# Patient Record
Sex: MALE | URBAN - METROPOLITAN AREA
[De-identification: names, ages, dates, MRNs, and addresses within clinical notes are randomized per-mention and may not be internally consistent; named-entity substitution may affect disease eponyms.]

---

## 2023-06-01 ENCOUNTER — HOSPITAL ENCOUNTER (EMERGENCY)
Age: 38
Discharge: HOME | End: 2023-06-01

## 2023-06-01 VITALS
TEMPERATURE: 97.9 F | DIASTOLIC BLOOD PRESSURE: 81 MMHG | RESPIRATION RATE: 16 BRPM | HEIGHT: 67 IN | WEIGHT: 199 LBS | HEART RATE: 70 BPM | SYSTOLIC BLOOD PRESSURE: 126 MMHG | OXYGEN SATURATION: 98 % | BODY MASS INDEX: 31.23 KG/M2

## 2023-11-29 ENCOUNTER — HOSPITAL ENCOUNTER (INPATIENT)
Age: 38
LOS: 1 days | Discharge: HOME | End: 2023-11-30
Attending: EMERGENCY MEDICINE | Admitting: INTERNAL MEDICINE
Payer: COMMERCIAL

## 2023-11-29 DIAGNOSIS — E11.10 DIABETIC KETOACIDOSIS WITHOUT COMA ASSOCIATED WITH TYPE 2 DIABETES MELLITUS (HCC): Primary | ICD-10-CM

## 2023-11-29 LAB
ALBUMIN SERPL-MCNC: 5 GM/DL (ref 3.4–5)
ALP BLD-CCNC: 81 IU/L (ref 40–129)
ALT SERPL-CCNC: 32 U/L (ref 10–40)
ANION GAP SERPL CALCULATED.3IONS-SCNC: 16 MMOL/L (ref 4–16)
ANION GAP SERPL CALCULATED.3IONS-SCNC: 17 MMOL/L (ref 4–16)
AST SERPL-CCNC: 17 IU/L (ref 15–37)
B-OH-BUTYR SERPL-MCNC: >46.8 MG/DL (ref 0–3)
BACTERIA: NEGATIVE /HPF
BASE EXCESS: 4 (ref 0–3.3)
BASE EXCESS: 7 (ref 0–3.3)
BASOPHILS ABSOLUTE: 0 K/CU MM
BASOPHILS RELATIVE PERCENT: 0.5 % (ref 0–1)
BILIRUB SERPL-MCNC: 0.4 MG/DL (ref 0–1)
BILIRUBIN URINE: ABNORMAL MG/DL
BLOOD, URINE: ABNORMAL
BUN SERPL-MCNC: 11 MG/DL (ref 6–23)
BUN SERPL-MCNC: 12 MG/DL (ref 6–23)
CALCIUM SERPL-MCNC: 10.2 MG/DL (ref 8.3–10.6)
CALCIUM SERPL-MCNC: 8.8 MG/DL (ref 8.3–10.6)
CHLORIDE BLD-SCNC: 101 MMOL/L (ref 99–110)
CHLORIDE BLD-SCNC: 94 MMOL/L (ref 99–110)
CHP ED QC CHECK: YES
CLARITY: CLEAR
CO2: 19 MMOL/L (ref 21–32)
CO2: 20 MMOL/L (ref 21–32)
COLOR: YELLOW
COMMENT: ABNORMAL
COMMENT: ABNORMAL
CREAT SERPL-MCNC: 0.9 MG/DL (ref 0.9–1.3)
CREAT SERPL-MCNC: 1 MG/DL (ref 0.9–1.3)
DIFFERENTIAL TYPE: ABNORMAL
EOSINOPHILS ABSOLUTE: 0.1 K/CU MM
EOSINOPHILS RELATIVE PERCENT: 1 % (ref 0–3)
GFR SERPL CREATININE-BSD FRML MDRD: >60 ML/MIN/1.73M2
GFR SERPL CREATININE-BSD FRML MDRD: >60 ML/MIN/1.73M2
GLUCOSE BLD-MCNC: 140 MG/DL (ref 70–99)
GLUCOSE BLD-MCNC: 160 MG/DL (ref 70–99)
GLUCOSE BLD-MCNC: 168 MG/DL (ref 70–99)
GLUCOSE BLD-MCNC: 205 MG/DL (ref 70–99)
GLUCOSE BLD-MCNC: 223 MG/DL (ref 70–99)
GLUCOSE BLD-MCNC: 253 MG/DL (ref 70–99)
GLUCOSE BLD-MCNC: 324 MG/DL (ref 70–99)
GLUCOSE BLD-MCNC: 325 MG/DL
GLUCOSE BLD-MCNC: 325 MG/DL (ref 70–99)
GLUCOSE SERPL-MCNC: 216 MG/DL (ref 70–99)
GLUCOSE SERPL-MCNC: 341 MG/DL (ref 70–99)
GLUCOSE, URINE: 250 MG/DL
HCO3 VENOUS: 21 MMOL/L (ref 19–25)
HCO3 VENOUS: 22.2 MMOL/L (ref 19–25)
HCT VFR BLD CALC: 45.2 % (ref 42–52)
HEMOGLOBIN: 15.1 GM/DL (ref 13.5–18)
IMMATURE NEUTROPHIL %: 0.3 % (ref 0–0.43)
KETONES, URINE: >80 MG/DL
LEUKOCYTE ESTERASE, URINE: NEGATIVE
LYMPHOCYTES ABSOLUTE: 2.3 K/CU MM
LYMPHOCYTES RELATIVE PERCENT: 30.2 % (ref 24–44)
MAGNESIUM: 1.6 MG/DL (ref 1.8–2.4)
MAGNESIUM: 1.7 MG/DL (ref 1.8–2.4)
MCH RBC QN AUTO: 26.6 PG (ref 27–31)
MCHC RBC AUTO-ENTMCNC: 33.4 % (ref 32–36)
MCV RBC AUTO: 79.6 FL (ref 78–100)
MONOCYTES ABSOLUTE: 0.5 K/CU MM
MONOCYTES RELATIVE PERCENT: 6.7 % (ref 0–4)
MUCUS: ABNORMAL HPF
NITRITE URINE, QUANTITATIVE: NEGATIVE
NUCLEATED RBC %: 0 %
O2 SAT, VEN: 58.8 % (ref 50–70)
O2 SAT, VEN: 69.9 % (ref 50–70)
PCO2, VEN: 44 MMHG (ref 38–52)
PCO2, VEN: 49 MMHG (ref 38–52)
PDW BLD-RTO: 14.1 % (ref 11.7–14.9)
PH VENOUS: 7.24 (ref 7.32–7.42)
PH VENOUS: 7.31 (ref 7.32–7.42)
PH, URINE: 5.5 (ref 5–8)
PHOSPHORUS: 2.2 MG/DL (ref 2.5–4.9)
PHOSPHORUS: 2.8 MG/DL (ref 2.5–4.9)
PLATELET # BLD: 451 K/CU MM (ref 140–440)
PMV BLD AUTO: 9.8 FL (ref 7.5–11.1)
PO2, VEN: 27 MMHG (ref 28–48)
PO2, VEN: 39 MMHG (ref 28–48)
POTASSIUM SERPL-SCNC: 3.8 MMOL/L (ref 3.5–5.1)
POTASSIUM SERPL-SCNC: 4.6 MMOL/L (ref 3.5–5.1)
PROTEIN UA: 100 MG/DL
RBC # BLD: 5.68 M/CU MM (ref 4.6–6.2)
RBC URINE: 2 /HPF (ref 0–3)
SEGMENTED NEUTROPHILS ABSOLUTE COUNT: 4.7 K/CU MM
SEGMENTED NEUTROPHILS RELATIVE PERCENT: 61.3 % (ref 36–66)
SODIUM BLD-SCNC: 131 MMOL/L (ref 135–145)
SODIUM BLD-SCNC: 136 MMOL/L (ref 135–145)
SPECIFIC GRAVITY UA: >1.03 (ref 1–1.03)
TOTAL IMMATURE NEUTOROPHIL: 0.02 K/CU MM
TOTAL NUCLEATED RBC: 0 K/CU MM
TOTAL PROTEIN: 9 GM/DL (ref 6.4–8.2)
TRICHOMONAS: ABNORMAL /HPF
UROBILINOGEN, URINE: 0.2 MG/DL (ref 0.2–1)
WBC # BLD: 7.7 K/CU MM (ref 4–10.5)
WBC UA: 3 /HPF (ref 0–2)

## 2023-11-29 PROCEDURE — 83036 HEMOGLOBIN GLYCOSYLATED A1C: CPT

## 2023-11-29 PROCEDURE — 2580000003 HC RX 258

## 2023-11-29 PROCEDURE — 6370000000 HC RX 637 (ALT 250 FOR IP)

## 2023-11-29 PROCEDURE — 6360000002 HC RX W HCPCS: Performed by: EMERGENCY MEDICINE

## 2023-11-29 PROCEDURE — 2000000000 HC ICU R&B

## 2023-11-29 PROCEDURE — 80053 COMPREHEN METABOLIC PANEL: CPT

## 2023-11-29 PROCEDURE — 96365 THER/PROPH/DIAG IV INF INIT: CPT

## 2023-11-29 PROCEDURE — 82010 KETONE BODYS QUAN: CPT

## 2023-11-29 PROCEDURE — 99285 EMERGENCY DEPT VISIT HI MDM: CPT

## 2023-11-29 PROCEDURE — 83735 ASSAY OF MAGNESIUM: CPT

## 2023-11-29 PROCEDURE — 96375 TX/PRO/DX INJ NEW DRUG ADDON: CPT

## 2023-11-29 PROCEDURE — 82962 GLUCOSE BLOOD TEST: CPT

## 2023-11-29 PROCEDURE — 84100 ASSAY OF PHOSPHORUS: CPT

## 2023-11-29 PROCEDURE — 2580000003 HC RX 258: Performed by: EMERGENCY MEDICINE

## 2023-11-29 PROCEDURE — 80048 BASIC METABOLIC PNL TOTAL CA: CPT

## 2023-11-29 PROCEDURE — 82805 BLOOD GASES W/O2 SATURATION: CPT

## 2023-11-29 PROCEDURE — 85025 COMPLETE CBC W/AUTO DIFF WBC: CPT

## 2023-11-29 PROCEDURE — 81001 URINALYSIS AUTO W/SCOPE: CPT

## 2023-11-29 PROCEDURE — 2500000003 HC RX 250 WO HCPCS: Performed by: EMERGENCY MEDICINE

## 2023-11-29 RX ORDER — ATORVASTATIN CALCIUM 20 MG/1
TABLET, FILM COATED ORAL
COMMUNITY
Start: 2023-11-17

## 2023-11-29 RX ORDER — 0.9 % SODIUM CHLORIDE 0.9 %
15 INTRAVENOUS SOLUTION INTRAVENOUS ONCE
Status: COMPLETED | OUTPATIENT
Start: 2023-11-29 | End: 2023-11-29

## 2023-11-29 RX ORDER — DEXTROSE MONOHYDRATE, SODIUM CHLORIDE, AND POTASSIUM CHLORIDE 50; 1.49; 4.5 G/1000ML; G/1000ML; G/1000ML
INJECTION, SOLUTION INTRAVENOUS CONTINUOUS PRN
Status: DISCONTINUED | OUTPATIENT
Start: 2023-11-29 | End: 2023-11-29

## 2023-11-29 RX ORDER — DEXTROSE AND SODIUM CHLORIDE 5; .45 G/100ML; G/100ML
INJECTION, SOLUTION INTRAVENOUS CONTINUOUS PRN
Status: DISCONTINUED | OUTPATIENT
Start: 2023-11-29 | End: 2023-11-30

## 2023-11-29 RX ORDER — POLYETHYLENE GLYCOL 3350 17 G/17G
17 POWDER, FOR SOLUTION ORAL DAILY PRN
Status: DISCONTINUED | OUTPATIENT
Start: 2023-11-29 | End: 2023-11-30 | Stop reason: HOSPADM

## 2023-11-29 RX ORDER — SODIUM CHLORIDE, SODIUM LACTATE, POTASSIUM CHLORIDE, AND CALCIUM CHLORIDE .6; .31; .03; .02 G/100ML; G/100ML; G/100ML; G/100ML
1000 INJECTION, SOLUTION INTRAVENOUS ONCE
Status: COMPLETED | OUTPATIENT
Start: 2023-11-29 | End: 2023-11-29

## 2023-11-29 RX ORDER — METFORMIN HYDROCHLORIDE 500 MG/1
500 TABLET, EXTENDED RELEASE ORAL DAILY
Qty: 30 TABLET | Refills: 0 | COMMUNITY
Start: 2023-11-17 | End: 2023-12-17

## 2023-11-29 RX ORDER — SODIUM CHLORIDE 450 MG/100ML
INJECTION, SOLUTION INTRAVENOUS CONTINUOUS
Status: DISCONTINUED | OUTPATIENT
Start: 2023-11-29 | End: 2023-11-30 | Stop reason: ALTCHOICE

## 2023-11-29 RX ORDER — FERROUS SULFATE 325(65) MG
325 TABLET ORAL
Status: DISCONTINUED | OUTPATIENT
Start: 2023-11-30 | End: 2023-11-30 | Stop reason: HOSPADM

## 2023-11-29 RX ORDER — MAGNESIUM SULFATE IN WATER 40 MG/ML
2000 INJECTION, SOLUTION INTRAVENOUS PRN
Status: DISCONTINUED | OUTPATIENT
Start: 2023-11-29 | End: 2023-11-30 | Stop reason: HOSPADM

## 2023-11-29 RX ORDER — ENOXAPARIN SODIUM 100 MG/ML
40 INJECTION SUBCUTANEOUS EVERY EVENING
Status: DISCONTINUED | OUTPATIENT
Start: 2023-11-29 | End: 2023-11-30 | Stop reason: HOSPADM

## 2023-11-29 RX ORDER — SODIUM CHLORIDE 450 MG/100ML
INJECTION, SOLUTION INTRAVENOUS CONTINUOUS
Status: DISCONTINUED | OUTPATIENT
Start: 2023-11-29 | End: 2023-11-29

## 2023-11-29 RX ORDER — POTASSIUM CHLORIDE 7.45 MG/ML
10 INJECTION INTRAVENOUS PRN
Status: DISCONTINUED | OUTPATIENT
Start: 2023-11-29 | End: 2023-11-30 | Stop reason: HOSPADM

## 2023-11-29 RX ORDER — ERGOCALCIFEROL 1.25 MG/1
CAPSULE ORAL
Status: ON HOLD | COMMUNITY
Start: 2023-11-17 | End: 2023-11-30 | Stop reason: HOSPADM

## 2023-11-29 RX ORDER — ATORVASTATIN CALCIUM 10 MG/1
20 TABLET, FILM COATED ORAL NIGHTLY
Status: DISCONTINUED | OUTPATIENT
Start: 2023-11-29 | End: 2023-11-30 | Stop reason: HOSPADM

## 2023-11-29 RX ORDER — FAMOTIDINE 20 MG/1
20 TABLET, FILM COATED ORAL 2 TIMES DAILY
Status: DISCONTINUED | OUTPATIENT
Start: 2023-11-29 | End: 2023-11-30 | Stop reason: HOSPADM

## 2023-11-29 RX ORDER — ERGOCALCIFEROL 1.25 MG/1
50000 CAPSULE ORAL ONCE
Status: COMPLETED | OUTPATIENT
Start: 2023-11-29 | End: 2023-11-29

## 2023-11-29 RX ORDER — INSULIN GLARGINE 100 [IU]/ML
40 INJECTION, SOLUTION SUBCUTANEOUS NIGHTLY
Qty: 12 ML | Refills: 2 | Status: ON HOLD | COMMUNITY
Start: 2023-11-17 | End: 2023-11-30 | Stop reason: HOSPADM

## 2023-11-29 RX ORDER — FERROUS SULFATE 325(65) MG
TABLET ORAL
COMMUNITY
Start: 2023-11-17

## 2023-11-29 RX ADMIN — ERGOCALCIFEROL 50000 UNITS: 1.25 CAPSULE ORAL at 22:07

## 2023-11-29 RX ADMIN — POTASSIUM CHLORIDE, DEXTROSE MONOHYDRATE AND SODIUM CHLORIDE: 150; 5; 450 INJECTION, SOLUTION INTRAVENOUS at 18:40

## 2023-11-29 RX ADMIN — DEXTROSE AND SODIUM CHLORIDE: 5; 450 INJECTION, SOLUTION INTRAVENOUS at 18:50

## 2023-11-29 RX ADMIN — ATORVASTATIN CALCIUM 20 MG: 10 TABLET, FILM COATED ORAL at 22:06

## 2023-11-29 RX ADMIN — SODIUM CHLORIDE 1355 ML: 9 INJECTION, SOLUTION INTRAVENOUS at 17:39

## 2023-11-29 RX ADMIN — FAMOTIDINE 20 MG: 20 TABLET ORAL at 22:06

## 2023-11-29 RX ADMIN — SODIUM CHLORIDE 5.79 UNITS/HR: 9 INJECTION, SOLUTION INTRAVENOUS at 19:38

## 2023-11-29 RX ADMIN — SODIUM CHLORIDE, POTASSIUM CHLORIDE, SODIUM LACTATE AND CALCIUM CHLORIDE 1000 ML: 600; 310; 30; 20 INJECTION, SOLUTION INTRAVENOUS at 17:24

## 2023-11-29 RX ADMIN — MAGNESIUM SULFATE HEPTAHYDRATE 2000 MG: 40 INJECTION, SOLUTION INTRAVENOUS at 23:54

## 2023-11-29 RX ADMIN — DEXTROSE AND SODIUM CHLORIDE 150 ML/HR: 5; 450 INJECTION, SOLUTION INTRAVENOUS at 19:44

## 2023-11-29 ASSESSMENT — ENCOUNTER SYMPTOMS
NAUSEA: 0
COUGH: 0
DIARRHEA: 0
BACK PAIN: 0
VOMITING: 0
SHORTNESS OF BREATH: 0
SORE THROAT: 0
ABDOMINAL PAIN: 0

## 2023-11-29 ASSESSMENT — PAIN SCALES - GENERAL: PAINLEVEL_OUTOF10: 0

## 2023-11-29 NOTE — CARE COORDINATION
[facility] criteria for DKA reviewed at this time, criteria supports Inpatient Admission.  CHIDI,RN/CM

## 2023-11-29 NOTE — ED NOTES
1682 called Dr Carlos Gardner on in patient consult from critical care provider     Dianne Tirado  11/29/23 3620 [facility]

## 2023-11-29 NOTE — H&P
V2.0  History and Physical      Name:  Lonzo Sacks /Age/Sex: 1985  (41 y.o. male)   MRN & CSN:  [id number] & [id number] Encounter Date/Time: 2023 6:39 PM EST   Location:  ED28/ED-28 PCP: No primary care provider on file. Hospital Day: 1    Assessment and Plan:   Lonzo Sacks is a 41 y.o. male with a pmh of DM, Hyperlipidemia, vit D and iron deficiency who presents with polyuria. Diabetic ketoacidosis  Type 2? Diabetes Mellitus  Does not appear infectious at this time, likely 2/2 noncompliance due to the inability to obtain his prescriptions as well as decreased understanding of diagnosis and treatment  Glu 341, AGAP 17  BHB > 46.8  VBG 7.24/49/39/  At [facility], thyroid function was nml; HILARIA autoantibody < 5; antipancreatic islet cells were negative; insulin c pep 0.63  Endocrinology following    Hyperlipidemia  Resume statin    Vitamin D deficiency  Iron deficiency  Resume vit D and iron supplements    Diet: NPO    Prophylaxis  SCDs, Lovenox, Pepcid    CODE STATUS: Full code    Plan:  Admit to ICU  Consult endocrinology  Consult case management, will need help obtaining prescriptions and potentially a new glucometer  POC Glu every hour  Initiate insulin drip  Electrolyte repletion per protocol  Maintenance IVF per DKA protocol  BMP, mag, Phos, VBG every 4 hours  CBC daily  Collect hemoglobin A1c  Maintain n.p.o. Disposition:   Current Living situation: home  Expected Disposition: home  Estimated D/C: 2 days    Diet Diet NPO   DVT Prophylaxis [x] Lovenox, []  Heparin, [x] SCDs, [] Ambulation,  [] Eliquis, [] Xarelto   Code Status No Order   Surrogate Decision Maker/ POA Unknown at this time     History from:     patient, electronic medical record    History of Present Illness:     Chief Complaint: polyuria  Lonzo Sacks is a 41 y.o. male with pmh of DM who presents with polyuria, weakness and visual disturbances. Patient is being admitted for DKA.   Patient recently admitted

## 2023-11-29 NOTE — ED NOTES
1750 paged critical care provider     Lamont Holguin  11/29/23 1750    1808 critical care provider returned call     Lamont Holguin  11/29/23 181

## 2023-11-30 VITALS
HEIGHT: 67 IN | DIASTOLIC BLOOD PRESSURE: 78 MMHG | OXYGEN SATURATION: 97 % | RESPIRATION RATE: 20 BRPM | BODY MASS INDEX: 27.68 KG/M2 | WEIGHT: 176.37 LBS | TEMPERATURE: 96.8 F | HEART RATE: 83 BPM | SYSTOLIC BLOOD PRESSURE: 114 MMHG

## 2023-11-30 LAB
ANION GAP SERPL CALCULATED.3IONS-SCNC: 12 MMOL/L (ref 4–16)
ANION GAP SERPL CALCULATED.3IONS-SCNC: 13 MMOL/L (ref 4–16)
BASE EXCESS: 4 (ref 0–3.3)
BASOPHILS ABSOLUTE: 0 K/CU MM
BASOPHILS RELATIVE PERCENT: 0.5 % (ref 0–1)
BUN SERPL-MCNC: 10 MG/DL (ref 6–23)
BUN SERPL-MCNC: 9 MG/DL (ref 6–23)
CALCIUM SERPL-MCNC: 8.4 MG/DL (ref 8.3–10.6)
CALCIUM SERPL-MCNC: 8.5 MG/DL (ref 8.3–10.6)
CHLORIDE BLD-SCNC: 102 MMOL/L (ref 99–110)
CHLORIDE BLD-SCNC: 103 MMOL/L (ref 99–110)
CO2: 19 MMOL/L (ref 21–32)
CO2: 21 MMOL/L (ref 21–32)
COMMENT: ABNORMAL
CREAT SERPL-MCNC: 0.7 MG/DL (ref 0.9–1.3)
CREAT SERPL-MCNC: 0.8 MG/DL (ref 0.9–1.3)
DIFFERENTIAL TYPE: ABNORMAL
EOSINOPHILS ABSOLUTE: 0.2 K/CU MM
EOSINOPHILS RELATIVE PERCENT: 2.3 % (ref 0–3)
ESTIMATED AVERAGE GLUCOSE: 278 MG/DL
GFR SERPL CREATININE-BSD FRML MDRD: >60 ML/MIN/1.73M2
GFR SERPL CREATININE-BSD FRML MDRD: >60 ML/MIN/1.73M2
GLUCOSE BLD-MCNC: 139 MG/DL (ref 70–99)
GLUCOSE BLD-MCNC: 152 MG/DL (ref 70–99)
GLUCOSE BLD-MCNC: 153 MG/DL (ref 70–99)
GLUCOSE BLD-MCNC: 161 MG/DL (ref 70–99)
GLUCOSE BLD-MCNC: 162 MG/DL (ref 70–99)
GLUCOSE BLD-MCNC: 164 MG/DL (ref 70–99)
GLUCOSE BLD-MCNC: 179 MG/DL (ref 70–99)
GLUCOSE BLD-MCNC: 181 MG/DL (ref 70–99)
GLUCOSE BLD-MCNC: 195 MG/DL (ref 70–99)
GLUCOSE BLD-MCNC: 208 MG/DL (ref 70–99)
GLUCOSE BLD-MCNC: 249 MG/DL (ref 70–99)
GLUCOSE SERPL-MCNC: 158 MG/DL (ref 70–99)
GLUCOSE SERPL-MCNC: 161 MG/DL (ref 70–99)
HBA1C MFR BLD: 11.3 % (ref 4.2–6.3)
HCO3 VENOUS: 22.2 MMOL/L (ref 19–25)
HCT VFR BLD CALC: 35.8 % (ref 42–52)
HEMOGLOBIN: 12.2 GM/DL (ref 13.5–18)
IMMATURE NEUTROPHIL %: 0.2 % (ref 0–0.43)
LYMPHOCYTES ABSOLUTE: 2.4 K/CU MM
LYMPHOCYTES RELATIVE PERCENT: 35.6 % (ref 24–44)
MAGNESIUM: 1.9 MG/DL (ref 1.8–2.4)
MCH RBC QN AUTO: 26.9 PG (ref 27–31)
MCHC RBC AUTO-ENTMCNC: 34.1 % (ref 32–36)
MCV RBC AUTO: 78.9 FL (ref 78–100)
MONOCYTES ABSOLUTE: 0.7 K/CU MM
MONOCYTES RELATIVE PERCENT: 10.1 % (ref 0–4)
NUCLEATED RBC %: 0 %
O2 SAT, VEN: 81.1 % (ref 50–70)
PCO2, VEN: 43 MMHG (ref 38–52)
PDW BLD-RTO: 14.2 % (ref 11.7–14.9)
PH VENOUS: 7.32 (ref 7.32–7.42)
PHOSPHORUS: 4.4 MG/DL (ref 2.5–4.9)
PLATELET # BLD: 356 K/CU MM (ref 140–440)
PMV BLD AUTO: 9.6 FL (ref 7.5–11.1)
PO2, VEN: 52 MMHG (ref 28–48)
POTASSIUM SERPL-SCNC: 3.4 MMOL/L (ref 3.5–5.1)
POTASSIUM SERPL-SCNC: 3.4 MMOL/L (ref 3.5–5.1)
RBC # BLD: 4.54 M/CU MM (ref 4.6–6.2)
SEGMENTED NEUTROPHILS ABSOLUTE COUNT: 3.4 K/CU MM
SEGMENTED NEUTROPHILS RELATIVE PERCENT: 51.3 % (ref 36–66)
SODIUM BLD-SCNC: 135 MMOL/L (ref 135–145)
SODIUM BLD-SCNC: 135 MMOL/L (ref 135–145)
TOTAL IMMATURE NEUTOROPHIL: 0.01 K/CU MM
TOTAL NUCLEATED RBC: 0 K/CU MM
WBC # BLD: 6.6 K/CU MM (ref 4–10.5)

## 2023-11-30 PROCEDURE — 80048 BASIC METABOLIC PNL TOTAL CA: CPT

## 2023-11-30 PROCEDURE — 6370000000 HC RX 637 (ALT 250 FOR IP): Performed by: STUDENT IN AN ORGANIZED HEALTH CARE EDUCATION/TRAINING PROGRAM

## 2023-11-30 PROCEDURE — 2580000003 HC RX 258

## 2023-11-30 PROCEDURE — 2580000003 HC RX 258: Performed by: EMERGENCY MEDICINE

## 2023-11-30 PROCEDURE — 94761 N-INVAS EAR/PLS OXIMETRY MLT: CPT

## 2023-11-30 PROCEDURE — 83735 ASSAY OF MAGNESIUM: CPT

## 2023-11-30 PROCEDURE — 6360000002 HC RX W HCPCS: Performed by: EMERGENCY MEDICINE

## 2023-11-30 PROCEDURE — 2580000003 HC RX 258: Performed by: INTERNAL MEDICINE

## 2023-11-30 PROCEDURE — 82962 GLUCOSE BLOOD TEST: CPT

## 2023-11-30 PROCEDURE — 84100 ASSAY OF PHOSPHORUS: CPT

## 2023-11-30 PROCEDURE — 2500000003 HC RX 250 WO HCPCS: Performed by: EMERGENCY MEDICINE

## 2023-11-30 PROCEDURE — 6370000000 HC RX 637 (ALT 250 FOR IP)

## 2023-11-30 PROCEDURE — 82805 BLOOD GASES W/O2 SATURATION: CPT

## 2023-11-30 PROCEDURE — 6370000000 HC RX 637 (ALT 250 FOR IP): Performed by: INTERNAL MEDICINE

## 2023-11-30 PROCEDURE — 85025 COMPLETE CBC W/AUTO DIFF WBC: CPT

## 2023-11-30 RX ORDER — SODIUM CHLORIDE 9 MG/ML
INJECTION, SOLUTION INTRAVENOUS CONTINUOUS
Status: DISCONTINUED | OUTPATIENT
Start: 2023-11-30 | End: 2023-11-30 | Stop reason: HOSPADM

## 2023-11-30 RX ORDER — FAMOTIDINE 20 MG/1
20 TABLET, FILM COATED ORAL 2 TIMES DAILY
Qty: 60 TABLET | Refills: 3 | Status: SHIPPED | OUTPATIENT
Start: 2023-11-30

## 2023-11-30 RX ORDER — INSULIN GLARGINE 100 [IU]/ML
30 INJECTION, SOLUTION SUBCUTANEOUS NIGHTLY
Status: DISCONTINUED | OUTPATIENT
Start: 2023-11-30 | End: 2023-11-30 | Stop reason: HOSPADM

## 2023-11-30 RX ORDER — INSULIN LISPRO 100 [IU]/ML
0-8 INJECTION, SOLUTION INTRAVENOUS; SUBCUTANEOUS
Status: DISCONTINUED | OUTPATIENT
Start: 2023-11-30 | End: 2023-11-30 | Stop reason: HOSPADM

## 2023-11-30 RX ORDER — POTASSIUM CHLORIDE 20 MEQ/1
40 TABLET, EXTENDED RELEASE ORAL ONCE
Status: COMPLETED | OUTPATIENT
Start: 2023-11-30 | End: 2023-11-30

## 2023-11-30 RX ORDER — INSULIN LISPRO 100 [IU]/ML
10 INJECTION, SOLUTION INTRAVENOUS; SUBCUTANEOUS
Status: DISCONTINUED | OUTPATIENT
Start: 2023-11-30 | End: 2023-11-30 | Stop reason: HOSPADM

## 2023-11-30 RX ORDER — INSULIN GLARGINE 100 [IU]/ML
30 INJECTION, SOLUTION SUBCUTANEOUS NIGHTLY
Qty: 10 ML | Refills: 3 | Status: SHIPPED | OUTPATIENT
Start: 2023-11-30

## 2023-11-30 RX ORDER — INSULIN LISPRO 100 [IU]/ML
7 INJECTION, SOLUTION INTRAVENOUS; SUBCUTANEOUS
Qty: 6.3 ML | Refills: 0 | Status: SHIPPED | OUTPATIENT
Start: 2023-11-30 | End: 2023-12-30

## 2023-11-30 RX ORDER — INSULIN GLARGINE 100 [IU]/ML
15 INJECTION, SOLUTION SUBCUTANEOUS ONCE
Status: COMPLETED | OUTPATIENT
Start: 2023-11-30 | End: 2023-11-30

## 2023-11-30 RX ORDER — INSULIN LISPRO 100 [IU]/ML
0-4 INJECTION, SOLUTION INTRAVENOUS; SUBCUTANEOUS
Status: DISCONTINUED | OUTPATIENT
Start: 2023-11-30 | End: 2023-11-30 | Stop reason: HOSPADM

## 2023-11-30 RX ADMIN — INSULIN GLARGINE 15 UNITS: 100 INJECTION, SOLUTION SUBCUTANEOUS at 12:23

## 2023-11-30 RX ADMIN — FERROUS SULFATE TAB 325 MG (65 MG ELEMENTAL FE) 325 MG: 325 (65 FE) TAB at 08:47

## 2023-11-30 RX ADMIN — INSULIN LISPRO 10 UNITS: 100 INJECTION, SOLUTION INTRAVENOUS; SUBCUTANEOUS at 12:24

## 2023-11-30 RX ADMIN — SODIUM CHLORIDE: 9 INJECTION, SOLUTION INTRAVENOUS at 08:12

## 2023-11-30 RX ADMIN — POTASSIUM CHLORIDE 10 MEQ: 7.46 INJECTION, SOLUTION INTRAVENOUS at 04:13

## 2023-11-30 RX ADMIN — POTASSIUM CHLORIDE 10 MEQ: 7.46 INJECTION, SOLUTION INTRAVENOUS at 06:25

## 2023-11-30 RX ADMIN — INSULIN LISPRO 10 UNITS: 100 INJECTION, SOLUTION INTRAVENOUS; SUBCUTANEOUS at 09:17

## 2023-11-30 RX ADMIN — DEXTROSE AND SODIUM CHLORIDE: 5; 450 INJECTION, SOLUTION INTRAVENOUS at 01:13

## 2023-11-30 RX ADMIN — POTASSIUM CHLORIDE 40 MEQ: 1500 TABLET, EXTENDED RELEASE ORAL at 12:23

## 2023-11-30 RX ADMIN — SODIUM PHOSPHATE, MONOBASIC, MONOHYDRATE AND SODIUM PHOSPHATE, DIBASIC, ANHYDROUS 15 MMOL: 142; 276 INJECTION, SOLUTION INTRAVENOUS at 00:05

## 2023-11-30 RX ADMIN — POTASSIUM CHLORIDE 10 MEQ: 7.46 INJECTION, SOLUTION INTRAVENOUS at 05:17

## 2023-11-30 RX ADMIN — INSULIN LISPRO 2 UNITS: 100 INJECTION, SOLUTION INTRAVENOUS; SUBCUTANEOUS at 12:23

## 2023-11-30 RX ADMIN — POTASSIUM CHLORIDE 10 MEQ: 7.46 INJECTION, SOLUTION INTRAVENOUS at 02:10

## 2023-11-30 RX ADMIN — FAMOTIDINE 20 MG: 20 TABLET ORAL at 08:47

## 2023-11-30 ASSESSMENT — PAIN SCALES - GENERAL
PAINLEVEL_OUTOF10: 0
PAINLEVEL_OUTOF10: 0

## 2023-11-30 NOTE — ED PROVIDER NOTES
Triage Chief Complaint:    Hyperglycemia    HPI   Guanakito Vera is a 41 y.o. male that presents for evaluation of hyperglycemia. The patient reports that he knows he is diabetic which is a new diagnosis for him. He believes he has been taking his medications. He does state over the last 2 days she has had some nausea decreased appetite some slight abdominal discomfort. He has not eaten anything all day today. The patient has not had any urinary symptoms. No change to bowel movements. Denies any steroids or antibiotics. The patient has not had any fevers or chills cough or congestion. History from : Patient    Limitations to history : Language [place] Creole    ROS:  10 systems reviewed and negative except as above. No past medical history on file. No past surgical history on file. No family history on file.   Social History     Socioeconomic History    Marital status: Single     Spouse name: Not on file    Number of children: Not on file    Years of education: Not on file    Highest education level: Not on file   Occupational History    Not on file   Tobacco Use    Smoking status: Not on file    Smokeless tobacco: Not on file   Substance and Sexual Activity    Alcohol use: Not on file    Drug use: Not on file    Sexual activity: Not on file   Other Topics Concern    Not on file   Social History Narrative    Not on file     Social Determinants of Health     Financial Resource Strain: Not on file   Food Insecurity: Not on file   Transportation Needs: Not on file   Physical Activity: Not on file   Stress: Not on file   Social Connections: Not on file   Intimate Partner Violence: Not on file   Housing Stability: Not on file     Current Facility-Administered Medications   Medication Dose Route Frequency Provider Last Rate Last Admin    dextrose bolus 10% 125 mL  125 mL IntraVENous PRN Eugenia Kenny MD        Or    dextrose bolus 10% 250 mL  250 mL IntraVENous PRN Eugenia Kenny MD        potassium chloride Determinants: Healthcare illiteracy and Financial constraints possibly including lack of insurance, transportation, and/or ability to afford treatment    Records Reviewed : Outpatient Notes from [facility] 2 weeks ago reviewed and does show that time the patient was given diabetic medications and instructions on how to manage this illness. Chronic conditions affecting care: DM    Labs ordered and results as above (interpreted by myself), concerning for beta hydroxybutyrate is significantly elevated, patient is also acidotic with a pH of 724, bicarb on the CMP is 20 with an anion gap of 17 so appears to be borderline DKA. Urinalysis does show greater than 80 ketones. My suspicion is that he is turning into DKA especially given the progression of his GI symptoms.     Patient was given the following medications:  Medications   dextrose bolus 10% 125 mL (has no administration in time range)     Or   dextrose bolus 10% 250 mL (has no administration in time range)   potassium chloride 10 mEq/100 mL IVPB (Peripheral Line) (has no administration in time range)   magnesium sulfate 2000 mg in 50 mL IVPB premix (2,000 mg IntraVENous New Bag 11/29/23 2594)   sodium phosphate 15 mmol in sodium chloride 0.9 % 250 mL IVPB (15 mmol IntraVENous New Bag 11/30/23 0005)   sodium chloride 0.9 % bolus 1,355 mL (0 mLs IntraVENous Stopped 11/29/23 1853)     Followed by   0.45 % sodium chloride infusion ( IntraVENous Not Given 11/29/23 1946)   dextrose bolus 10% 125 mL (has no administration in time range)     Or   dextrose bolus 10% 250 mL (has no administration in time range)   polyethylene glycol (GLYCOLAX) packet 17 g (has no administration in time range)   enoxaparin (LOVENOX) injection 40 mg (40 mg SubCUTAneous Not Given 11/29/23 1946)   dextrose 5 % and 0.45 % sodium chloride infusion (150 mL/hr IntraVENous New Bag 11/29/23 1944)   insulin (HumuLIN R) 100 units in sodium chloride 0.9% 100 mL infusion (2.4 Units/hr IntraVENous

## 2023-11-30 NOTE — PROGRESS NOTES
Outpatient Pharmacy Progress Note for Meds-to-Beds    Total number of Prescriptions Filled: 3  The following medications were dispensed to the patient during the discharge process:  Lantus insulin  Humalog insulin  Insulin pen needles    Additional Documentation:  Medication(s) were delivered to the patient's room prior to discharge      Thank you for letting us serve your patients.   [address], [address] [place] [address]    Phone: [phone]    Fax: [phone]

## 2023-11-30 NOTE — PROGRESS NOTES
4 Eyes Skin Assessment     NAME:  Lashawn Moctezuma  YOB: 1985  MEDICAL RECORD NUMBER:  [id number]    The patient is being assessed for  Admission    I agree that at least one RN has performed a thorough Head to Toe Skin Assessment on the patient. ALL assessment sites listed below have been assessed. Areas assessed by both nurses:    Head, Face, Ears, Shoulders, Back, Chest, Arms, Elbows, Hands, Sacrum. Buttock, Coccyx, Ischium, Legs. Feet and Heels, and Under Medical Devices         Does the Patient have a Wound?  No noted wound(s)       Jesus Prevention initiated by RN: No  Wound Care Orders initiated by RN: No    Pressure Injury (Stage 3,4, Unstageable, DTI, NWPT, and Complex wounds) if present, place Wound referral order by RN under : No    New Ostomies, if present place, Ostomy referral order under : No     Nurse 1 eSignature: Electronically signed by Mendel Innocent, RN on 11/30/23 at 7:25 AM EST    **SHARE this note so that the co-signing nurse can place an eSignature**    Nurse 2 eSignature: Electronically signed by Jaydon Cuevas RN on 11/30/23 at 7:27 AM EST

## 2023-11-30 NOTE — CONSULTS
V2.0  [facility] [place]      Name:  Caprice Espinal /Age/Sex: 1985  (41 y.o. male)   MRN & CSN:  [id number] & [id number] Encounter Date/Time: 2023 12:18 PM EST   Location:  -A PCP: No primary care provider on file. Attending:Eric Etienne., *  Consulting Provider: Ashwini Mccoy MD      Hospital Day: 2    Assessment and Recommendations   Caprice Espinal is a 41 y.o. male who presents with DKA, type 2, not at goal [facility])    Hospital Problems             Last Modified POA    * (Principal) DKA, type 2, not at goal [facility]) 2023 Yes       Recommendations:     Diabetic ketoacidosis - resolved  Type 2? Diabetes Mellitus  Does not appear infectious at this time, likely 2/2 noncompliance due to the inability to obtain his prescriptions as well as decreased understanding of diagnosis and treatment  On admission Glu 341, AGAP 17. AG has closed now  BHB > 46.8  VBG 7.24/49/39/21  At [facility], thyroid function was nml; HILARIA autoantibody < 5; antipancreatic islet cells were negative; insulin c pep 0.63  Endocrinology following  S/p Insulin drip. Will give 15 U Lantus now. Takes 40 U Hs at home. Currently tolerating food well. Will DC fluids. Overall doing better. No n/v or abd pain. Plan to dc home and advised for medical adherence. Needs to follow up with Endocrinology      Hyperlipidemia  Resume statin     Vitamin D deficiency  Iron deficiency  Resume vit D and iron supplements         Diet ADULT DIET; Regular; 4 carb choices (60 gm/meal)   DVT Prophylaxis [x] Lovenox, []  Heparin, [] SCDs, [] Ambulation,  [] Eliquis, [] Xarelto   Code Status Full Code   Surrogate Decision Maker/ POA       Personally reviewed Lab Studies and Imaging       History From:    History obtained from the patient. History of Present Illness:      Chief Complaint: polyuria    Caprice Espinal is a 41 y.o. male who presents with polyuria, weakness and visual disturbances    Currently tolerating food well. Overall doing better. No n/v or abd pain      Review of Systems:      Pertinent positives and negatives discussed in HPI    Objective: Intake/Output Summary (Last 24 hours) at 11/30/2023 1218  Last data filed at 11/30/2023 0956  Gross per 24 hour   Intake 2376.84 ml   Output 1100 ml   Net 1276.84 ml      Vitals:   Vitals:    11/30/23 0957 11/30/23 1003 11/30/23 1103 11/30/23 1203   BP: 121/75 121/75 116/62 109/72   Pulse: 85 87 73 67   Resp:       Temp:       TempSrc:       SpO2:       Weight:       Height:             Physical Exam:      General: NAD  Eyes: EOMI  ENT: neck supple  Cardiovascular: Regular rate. Respiratory: Clear to auscultation  Gastrointestinal: Soft, non tender  Genitourinary: no suprapubic tenderness  Musculoskeletal: No edema  Skin: warm, dry  Neuro: Alert. Psych: Mood appropriate. Past Medical History:   PMHx History reviewed. No pertinent past medical history. PSHX:  has no past surgical history on file.   Allergies: No Known Allergies  Fam HX: No known family hx  Soc HX:   Social History     Socioeconomic History    Marital status: Single     Spouse name: None    Number of children: None    Years of education: None    Highest education level: None   Tobacco Use    Smoking status: Never    Smokeless tobacco: Never   Vaping Use    Vaping Use: Never used   Substance and Sexual Activity    Sexual activity: Defer     Partners: Female     Birth control/protection: Abstinence     Social Determinants of Health     Food Insecurity: No Food Insecurity (11/30/2023)    Hunger Vital Sign     Worried About Running Out of Food in the Last Year: Never true     Ran Out of Food in the Last Year: Never true   Transportation Needs: No Transportation Needs (11/30/2023)    PRAPARE - Transportation     Lack of Transportation (Medical): No     Lack of Transportation (Non-Medical): No   Housing Stability: High Risk (11/30/2023)    Housing Stability Vital Sign     Unable to Pay for Housing in the Last Year:

## 2023-11-30 NOTE — PROGRESS NOTES
Patient discharged to home at this time. Ivs removed. Discharge instructions given to patient via . Insulin education provided. Patient verbalizes understanding at this time. VSS. No s/s of acute distress noted at time of discharge.

## 2023-11-30 NOTE — CARE COORDINATION
Received referral from [facility] op pharmacy to assist with medications at discharge. Approved a 1x voucher for lantus and humalog and faxed to op pharmacy. Unable to assist with famotidine - OTC medication. Added pt to the flagged list.  If pt requests additional help he must complete a Med Assist application and provide required documents to determine eligibility for ongoing assistance.

## 2023-11-30 NOTE — PROGRESS NOTES
SpO2: 95%       Physical Exam:   General: NAD  Eyes: EOMI  ENT: neck supple, trachea midline  Cardiovascular: Regular rate and rhythm  Respiratory: Clear to auscultation, chest expansion symmetric  Gastrointestinal: Soft, non tender, nondistended  Genitourinary: voids without difficulty  Musculoskeletal: No edema, no deformity  Skin: warm, dry, intact  Neuro: Alert. Oriented. Follows commands. Moves all ext spontaneously  Psych: Mood appropriate.      Medications:   Medications:    insulin lispro  10 Units SubCUTAneous TID WC    insulin glargine  30 Units SubCUTAneous Nightly    insulin lispro  0-8 Units SubCUTAneous TID WC    insulin lispro  0-4 Units SubCUTAneous 2 times per day    enoxaparin  40 mg SubCUTAneous QPM    ferrous sulfate  325 mg Oral Daily with breakfast    atorvastatin  20 mg Oral Nightly    famotidine  20 mg Oral BID      Infusions:    sodium chloride 100 mL/hr at 11/30/23 0812    insulin 1.58 Units/hr (11/30/23 0846)     PRN Meds: dextrose bolus, 125 mL, PRN   Or  dextrose bolus, 250 mL, PRN  potassium chloride, 10 mEq, PRN  magnesium sulfate, 2,000 mg, PRN  sodium phosphate 15 mmol in sodium chloride 0.9 % 250 mL IVPB, 15 mmol, PRN  dextrose bolus, 125 mL, PRN   Or  dextrose bolus, 250 mL, PRN  polyethylene glycol, 17 g, Daily PRN        Labs      Recent Results (from the past 24 hour(s))   Blood Gas, Venous    Collection Time: 11/29/23  1:45 PM   Result Value Ref Range    pH, Saul 7.24 (L) 7.32 - 7.42    pCO2, Saul 49 38 - 52 mmHG    pO2, Saul 39 28 - 48 mmHG    Base Excess 7 (H) 0 - 3.3    HCO3, Venous 21.0 19 - 25 MMOL/L    O2 Sat, Saul 69.9 50 - 70 %    Comment VBG    CBC with Auto Differential    Collection Time: 11/29/23  2:10 PM   Result Value Ref Range    WBC 7.7 4.0 - 10.5 K/CU MM    RBC 5.68 4.6 - 6.2 M/CU MM    Hemoglobin 15.1 13.5 - 18.0 GM/DL    Hematocrit 45.2 42 - 52 %    MCV 79.6 78 - 100 FL    MCH 26.6 (L) 27 - 31 PG    MCHC 33.4 32.0 - 36.0 %    RDW 14.1 11.7 - 14.9 %    Platelets BUN 9 6 - 23 MG/DL    Creatinine 0.7 (L) 0.9 - 1.3 MG/DL    Est, Glom Filt Rate >60 >60 mL/min/1.73m2    Calcium 8.5 8.3 - 10.6 MG/DL   Magnesium    Collection Time: 11/30/23  3:15 AM   Result Value Ref Range    Magnesium 1.9 1.8 - 2.4 mg/dl   Phosphorus    Collection Time: 11/30/23  3:15 AM   Result Value Ref Range    Phosphorus 4.4 2.5 - 4.9 MG/DL   CBC with Auto Differential    Collection Time: 11/30/23  3:15 AM   Result Value Ref Range    WBC 6.6 4.0 - 10.5 K/CU MM    RBC 4.54 (L) 4.6 - 6.2 M/CU MM    Hemoglobin 12.2 (L) 13.5 - 18.0 GM/DL    Hematocrit 35.8 (L) 42 - 52 %    MCV 78.9 78 - 100 FL    MCH 26.9 (L) 27 - 31 PG    MCHC 34.1 32.0 - 36.0 %    RDW 14.2 11.7 - 14.9 %    Platelets 629 700 - 737 K/CU MM    MPV 9.6 7.5 - 11.1 FL    Differential Type AUTOMATED DIFFERENTIAL     Segs Relative 51.3 36 - 66 %    Lymphocytes % 35.6 24 - 44 %    Monocytes % 10.1 (H) 0 - 4 %    Eosinophils % 2.3 0 - 3 %    Basophils % 0.5 0 - 1 %    Segs Absolute 3.4 K/CU MM    Lymphocytes Absolute 2.4 K/CU MM    Monocytes Absolute 0.7 K/CU MM    Eosinophils Absolute 0.2 K/CU MM    Basophils Absolute 0.0 K/CU MM    Nucleated RBC % 0.0 %    Total Nucleated RBC 0.0 K/CU MM    Total Immature Neutrophil 0.01 K/CU MM    Immature Neutrophil % 0.2 0 - 0.43 %   POCT Glucose    Collection Time: 11/30/23  4:10 AM   Result Value Ref Range    POC Glucose 161 (H) 70 - 99 MG/DL   POCT Glucose    Collection Time: 11/30/23  5:15 AM   Result Value Ref Range    POC Glucose 179 (H) 70 - 99 MG/DL   POCT Glucose    Collection Time: 11/30/23  6:06 AM   Result Value Ref Range    POC Glucose 181 (H) 70 - 99 MG/DL   POCT Glucose    Collection Time: 11/30/23  6:58 AM   Result Value Ref Range    POC Glucose 153 (H) 70 - 99 MG/DL   POCT Glucose    Collection Time: 11/30/23  8:43 AM   Result Value Ref Range    POC Glucose 139 (H) 70 - 99 MG/DL        Imaging/Diagnostics Last 24 Hours   No results found.     Electronically signed by CHEY Kauffman CNP

## 2023-11-30 NOTE — PROGRESS NOTES
I have personally seen and examined the patient independently. I have reviewed the patient's available data,including medical history and recent test results. Reviewed and discussed note as documented by ADOLFO. I agree with the physical exam findings, assessment and plan. My documented MDM is a substantive portion of the supervisory note. Diabetic ketoacidosis, type II  History of hyperlipidemia  History of iron deficiency  History of vitamin D deficiency    Transition off insulin infusion to subcu program per endocrinology service.   Once accomplished, transfer out of ICU setting, transition care to hospital medicine service    Mobilize    DVT ulcer prophylaxis    Complex decisions required for evaluation and management, reviewed during critical care rounds    Wendy Arguelles  [phone]

## 2023-11-30 NOTE — CARE COORDINATION
11/30/23 1356   Service Assessment   Patient Orientation Alert and Oriented   Cognition Alert   History Provided By Patient;Medical Record   Primary [address] Members   Patient's Healthcare Decision Maker is: Legal Next of [facility]   PCP Verified by CM No   Prior Functional Level Independent in ADLs/IADLs   Current Functional Level Independent in ADLs/IADLs   Can patient return to prior living arrangement Yes   Ability to make needs known: Good  (Intrepreter services)   Family able to assist with home care needs: Yes   Would you like for me to discuss the discharge plan with any other family members/significant others, and if so, who? No   Community Resources None   Social/Functional History   Lives With Family   Type of Home Apartment     Met with patient for discharge planning. He is [facility]- needs  services #[phone]  Amanda Dunbar. Patient is from home; lives with family. He works and voiced concern for return to work slip. He does not have a PCP Will refer to [facility] for appointment. Patient did get scripts filled at [facility] after admission to [facility] (11/15-11/17) Stated that he understood how to use Glucometer and administer insulin. He denies needs with medication affordability and denies food insecurity. He has his care in the lot and plans to drive himself home. Patient denies needs at this time. Reinforced that CM is available for needs and patient may return to ED if issues arise. Shahla Rawls RN      0482 Contacted [facility]- left detailed message for Ced Kramer ([facility] population coordinator) .  Shahla Rawls RN

## 2023-11-30 NOTE — ED NOTES
ED TO INPATIENT SBAR HANDOFF    Patient Name: Frankey Butte   :  1985  41 y.o.    Preferred Name  [facility]  Family/Caregiver Present yes   Restraints no   C-SSRS: Risk of Suicide: No Risk  Sitter no   Sepsis Risk Score Sepsis Risk Score: 0.65      Situation  Chief Complaint   Patient presents with    Hyperglycemia     Brief Description of Patient's Condition: Patient presents with complaint of hyperglycemia  Mental Status: oriented, alert, coherent, logical, thought processes intact, and able to concentrate and follow conversation  Arrived from: home    Imaging:   No orders to display     Abnormal labs:   Abnormal Labs Reviewed   CBC WITH AUTO DIFFERENTIAL - Abnormal; Notable for the following components:       Result Value    MCH 26.6 (*)     Platelets 300 (*)     Monocytes % 6.7 (*)     All other components within normal limits   COMPREHENSIVE METABOLIC PANEL - Abnormal; Notable for the following components:    Sodium 131 (*)     Chloride 94 (*)     CO2 20 (*)     Anion Gap 17 (*)     Glucose 341 (*)     Total Protein 9.0 (*)     All other components within normal limits   BLOOD GAS, VENOUS - Abnormal; Notable for the following components:    pH, Saul 7.24 (*)     Base Excess 7 (*)     All other components within normal limits   BETA-HYDROXYBUTYRATE - Abnormal; Notable for the following components:    Beta-Hydroxybutyrate >46.8 (*)     All other components within normal limits   URINALYSIS WITH REFLEX TO CULTURE - Abnormal; Notable for the following components:    Glucose, Urine 250 (*)     Bilirubin Urine SMALL NUMBER OR AMOUNT OBSERVED (*)     Ketones, Urine >80 (*)     Blood, Urine SMALL NUMBER OR AMOUNT OBSERVED (*)     Protein,  (*)     All other components within normal limits   URINE MICROSCOPIC WITH REFLEX TO CULTURE - Abnormal; Notable for the following components:    WBC, UA 3 (*)     Mucus, UA RARE (*)     All other components within normal limits   POCT GLUCOSE - Abnormal; Notable for the

## 2023-11-30 NOTE — DISCHARGE SUMMARY
V2.0  Discharge Summary    Name:  Williams Gu /Age/Sex: 1985 (41 y.o. male)   Admit Date: 2023  Discharge Date: 23    MRN & CSN:  [id number] & [id number] Encounter Date and Time 23 1:59 PM EST    Attending:  Jewels Roman,* Discharging Provider: Jewels Roman MD       Hospital Course:     Brief HPI: Williams Gu is a 41 y.o. male with pmh of DM who presents with polyuria, weakness and visual disturbances. Patient is being admitted for DKA. Patient recently admitted to [facility] for new onset diabetes with ketosis. Patient was discharged on metformin and 40 units of Lantus at bedtime. Patient states that he did not fill his prescriptions upon discharge as he could not afford them. Patient was also found to be vitamin D and iron deficient. He was started on supplementation at that time. Additionally patient was started on 20 mg of atorvastatin daily for hyperlipidemia. Patient received 2.4L IVF in ED and was started on an insulin drip. VS on admission were /72, HR 87, RR 21, 99% on room air, and T98.3. Pertinent lab values admission include sodium 131, anion gap 17, glucose 341, beta hydroxybutyrate > 46.8.  UA negative for infection. VBG 7.2 . Patient will be admitted to the ICU for further monitoring and management. Brief Problem Based Course:     Diabetic ketoacidosis - resolved  Type 2? Diabetes Mellitus  Does not appear infectious at this time, likely 2/2 noncompliance due to the inability to obtain his prescriptions as well as decreased understanding of diagnosis and treatment  On admission Glu 341, AGAP 17. AG has closed now  BHB > 46.8  VBG 7.24/  At [facility], thyroid function was nml; HILARIA autoantibody < 5; antipancreatic islet cells were negative; insulin c pep 0.63  Endocrinology following  S/p Insulin drip. Will give 15 U Lantus now. Takes 40 U Hs at home. Will DC fluids. Overall doing better. No n/v or abd pain. NEGATIVE 11/29/2023 02:20 PM    COLORU YELLOW 11/29/2023 02:20 PM    WBCUA 3 11/29/2023 02:20 PM    RBCUA 2 11/29/2023 02:20 PM    MUCUS RARE 11/29/2023 02:20 PM    TRICHOMONAS NONE SEEN 11/29/2023 02:20 PM    BACTERIA NEGATIVE 11/29/2023 02:20 PM    CLARITYU CLEAR 11/29/2023 02:20 PM    SPECGRAV >1.030 11/29/2023 02:20 PM    LEUKOCYTESUR NEGATIVE 11/29/2023 02:20 PM    UROBILINOGEN 0.2 11/29/2023 02:20 PM    [address] NUMBER OR AMOUNT OBSERVED 11/29/2023 02:20 PM    BLOODU SMALL NUMBER OR AMOUNT OBSERVED 11/29/2023 02:20 PM    Meagan Hung >80 11/29/2023 02:20 PM     Urine Cultures: No results found for: \"LABURIN\"  Blood Cultures: No results found for: \"BC\"  No results found for: \"BLOODCULT2\"  Organism: No results found for: \"ORG\"    Time Spent Discharging patient 55 minutes    Electronically signed by Richard Diaz MD on 11/30/2023 at 1:59 PM

## 2024-03-11 ENCOUNTER — APPOINTMENT (OUTPATIENT)
Dept: CT IMAGING | Age: 39
DRG: 639 | End: 2024-03-11
Payer: COMMERCIAL

## 2024-03-11 ENCOUNTER — HOSPITAL ENCOUNTER (INPATIENT)
Age: 39
LOS: 2 days | Discharge: HOME | DRG: 639 | End: 2024-03-13
Attending: STUDENT IN AN ORGANIZED HEALTH CARE EDUCATION/TRAINING PROGRAM | Admitting: STUDENT IN AN ORGANIZED HEALTH CARE EDUCATION/TRAINING PROGRAM
Payer: COMMERCIAL

## 2024-03-11 DIAGNOSIS — E11.10 DIABETIC KETOACIDOSIS WITHOUT COMA ASSOCIATED WITH TYPE 2 DIABETES MELLITUS (HCC): Primary | ICD-10-CM

## 2024-03-11 LAB
ALBUMIN SERPL-MCNC: 4.3 GM/DL (ref 3.4–5)
ALP BLD-CCNC: 117 IU/L (ref 40–129)
ALT SERPL-CCNC: 20 U/L (ref 10–40)
ANION GAP SERPL CALCULATED.3IONS-SCNC: 15 MMOL/L (ref 7–16)
ANION GAP SERPL CALCULATED.3IONS-SCNC: 21 MMOL/L (ref 7–16)
AST SERPL-CCNC: 15 IU/L (ref 15–37)
B-OH-BUTYR SERPL-MCNC: >46.8 MG/DL (ref 0–3)
BASE EXCESS: 17 (ref 0–3)
BASOPHILS ABSOLUTE: 0 K/CU MM
BASOPHILS RELATIVE PERCENT: 0.5 % (ref 0–1)
BILIRUB SERPL-MCNC: 0.3 MG/DL (ref 0–1)
BUN SERPL-MCNC: 10 MG/DL (ref 6–23)
BUN SERPL-MCNC: 9 MG/DL (ref 6–23)
CALCIUM SERPL-MCNC: 7.9 MG/DL (ref 8.3–10.6)
CALCIUM SERPL-MCNC: 8.9 MG/DL (ref 8.3–10.6)
CHLORIDE BLD-SCNC: 102 MMOL/L (ref 99–110)
CHLORIDE BLD-SCNC: 115 MMOL/L (ref 99–110)
CO2: 12 MMOL/L (ref 21–32)
CO2: 14 MMOL/L (ref 21–32)
COMMENT: ABNORMAL
CREAT SERPL-MCNC: 0.9 MG/DL (ref 0.9–1.3)
CREAT SERPL-MCNC: 1.1 MG/DL (ref 0.9–1.3)
DIFFERENTIAL TYPE: ABNORMAL
EOSINOPHILS ABSOLUTE: 0 K/CU MM
EOSINOPHILS RELATIVE PERCENT: 0.3 % (ref 0–3)
GFR SERPL CREATININE-BSD FRML MDRD: >60 ML/MIN/1.73M2
GFR SERPL CREATININE-BSD FRML MDRD: >60 ML/MIN/1.73M2
GLUCOSE BLD-MCNC: 214 MG/DL (ref 70–99)
GLUCOSE BLD-MCNC: 215 MG/DL (ref 70–99)
GLUCOSE BLD-MCNC: 243 MG/DL (ref 70–99)
GLUCOSE BLD-MCNC: 455 MG/DL (ref 70–99)
GLUCOSE SERPL-MCNC: 172 MG/DL (ref 70–99)
GLUCOSE SERPL-MCNC: 509 MG/DL (ref 70–99)
HCO3 VENOUS: 11.7 MMOL/L (ref 22–29)
HCT VFR BLD CALC: 47.5 % (ref 42–52)
HEMOGLOBIN: 15.7 GM/DL (ref 13.5–18)
IMMATURE NEUTROPHIL %: 0.3 % (ref 0–0.43)
INFLUENZA A ANTIGEN: NOT DETECTED
INFLUENZA B ANTIGEN: NOT DETECTED
LACTIC ACID, SEPSIS: 1.1 MMOL/L (ref 0.4–2)
LACTIC ACID, SEPSIS: 1.2 MMOL/L (ref 0.4–2)
LYMPHOCYTES ABSOLUTE: 1.7 K/CU MM
LYMPHOCYTES RELATIVE PERCENT: 27.8 % (ref 24–44)
MAGNESIUM: 1.7 MG/DL (ref 1.8–2.4)
MCH RBC QN AUTO: 26.9 PG (ref 27–31)
MCHC RBC AUTO-ENTMCNC: 33.1 % (ref 32–36)
MCV RBC AUTO: 81.3 FL (ref 78–100)
MONOCYTES ABSOLUTE: 0.8 K/CU MM
MONOCYTES RELATIVE PERCENT: 13.3 % (ref 0–4)
NUCLEATED RBC %: 0 %
O2 SAT, VEN: 53 % (ref 50–70)
PCO2, VEN: 36 MMHG (ref 41–51)
PDW BLD-RTO: 15.6 % (ref 11.7–14.9)
PH VENOUS: 7.12 (ref 7.32–7.43)
PHOSPHORUS: 1.9 MG/DL (ref 2.5–4.9)
PLATELET # BLD: 302 K/CU MM (ref 140–440)
PMV BLD AUTO: 10.9 FL (ref 7.5–11.1)
PO2, VEN: 35 MMHG (ref 28–48)
POTASSIUM SERPL-SCNC: 3.4 MMOL/L (ref 3.5–5.1)
POTASSIUM SERPL-SCNC: 5.1 MMOL/L (ref 3.5–5.1)
RBC # BLD: 5.84 M/CU MM (ref 4.6–6.2)
REASON FOR REJECTION: NORMAL
REJECTED TEST: NORMAL
SARS-COV-2 RDRP RESP QL NAA+PROBE: NOT DETECTED
SEGMENTED NEUTROPHILS ABSOLUTE COUNT: 3.4 K/CU MM
SEGMENTED NEUTROPHILS RELATIVE PERCENT: 57.8 % (ref 36–66)
SODIUM BLD-SCNC: 135 MMOL/L (ref 135–145)
SODIUM BLD-SCNC: 144 MMOL/L (ref 135–145)
SOURCE: NORMAL
TOTAL IMMATURE NEUTOROPHIL: 0.02 K/CU MM
TOTAL NUCLEATED RBC: 0 K/CU MM
TOTAL PROTEIN: 8 GM/DL (ref 6.4–8.2)
WBC # BLD: 5.9 K/CU MM (ref 4–10.5)

## 2024-03-11 PROCEDURE — 2580000003 HC RX 258: Performed by: PHYSICIAN ASSISTANT

## 2024-03-11 PROCEDURE — 96374 THER/PROPH/DIAG INJ IV PUSH: CPT

## 2024-03-11 PROCEDURE — 99285 EMERGENCY DEPT VISIT HI MDM: CPT

## 2024-03-11 PROCEDURE — 6370000000 HC RX 637 (ALT 250 FOR IP): Performed by: PHYSICIAN ASSISTANT

## 2024-03-11 PROCEDURE — C9113 INJ PANTOPRAZOLE SODIUM, VIA: HCPCS | Performed by: PHYSICIAN ASSISTANT

## 2024-03-11 PROCEDURE — 87635 SARS-COV-2 COVID-19 AMP PRB: CPT

## 2024-03-11 PROCEDURE — 93005 ELECTROCARDIOGRAM TRACING: CPT | Performed by: PHYSICIAN ASSISTANT

## 2024-03-11 PROCEDURE — 84100 ASSAY OF PHOSPHORUS: CPT

## 2024-03-11 PROCEDURE — 83036 HEMOGLOBIN GLYCOSYLATED A1C: CPT

## 2024-03-11 PROCEDURE — 96375 TX/PRO/DX INJ NEW DRUG ADDON: CPT

## 2024-03-11 PROCEDURE — 6360000002 HC RX W HCPCS: Performed by: PHYSICIAN ASSISTANT

## 2024-03-11 PROCEDURE — 82962 GLUCOSE BLOOD TEST: CPT

## 2024-03-11 PROCEDURE — 82010 KETONE BODYS QUAN: CPT

## 2024-03-11 PROCEDURE — 2000000000 HC ICU R&B

## 2024-03-11 PROCEDURE — 87502 INFLUENZA DNA AMP PROBE: CPT

## 2024-03-11 PROCEDURE — 2580000003 HC RX 258: Performed by: STUDENT IN AN ORGANIZED HEALTH CARE EDUCATION/TRAINING PROGRAM

## 2024-03-11 PROCEDURE — 83735 ASSAY OF MAGNESIUM: CPT

## 2024-03-11 PROCEDURE — 80048 BASIC METABOLIC PNL TOTAL CA: CPT

## 2024-03-11 PROCEDURE — 85025 COMPLETE CBC W/AUTO DIFF WBC: CPT

## 2024-03-11 PROCEDURE — 70450 CT HEAD/BRAIN W/O DYE: CPT

## 2024-03-11 PROCEDURE — 82805 BLOOD GASES W/O2 SATURATION: CPT

## 2024-03-11 PROCEDURE — 83605 ASSAY OF LACTIC ACID: CPT

## 2024-03-11 PROCEDURE — 80053 COMPREHEN METABOLIC PANEL: CPT

## 2024-03-11 RX ORDER — 0.9 % SODIUM CHLORIDE 0.9 %
1000 INTRAVENOUS SOLUTION INTRAVENOUS ONCE
Status: COMPLETED | OUTPATIENT
Start: 2024-03-11 | End: 2024-03-11

## 2024-03-11 RX ORDER — 0.9 % SODIUM CHLORIDE 0.9 %
15 INTRAVENOUS SOLUTION INTRAVENOUS ONCE
Status: DISCONTINUED | OUTPATIENT
Start: 2024-03-11 | End: 2024-03-11

## 2024-03-11 RX ORDER — METOCLOPRAMIDE HYDROCHLORIDE 5 MG/ML
10 INJECTION INTRAMUSCULAR; INTRAVENOUS ONCE
Status: COMPLETED | OUTPATIENT
Start: 2024-03-11 | End: 2024-03-11

## 2024-03-11 RX ORDER — DEXTROSE AND SODIUM CHLORIDE 5; .45 G/100ML; G/100ML
INJECTION, SOLUTION INTRAVENOUS CONTINUOUS PRN
Status: DISCONTINUED | OUTPATIENT
Start: 2024-03-11 | End: 2024-03-12

## 2024-03-11 RX ORDER — SODIUM CHLORIDE, SODIUM LACTATE, POTASSIUM CHLORIDE, CALCIUM CHLORIDE 600; 310; 30; 20 MG/100ML; MG/100ML; MG/100ML; MG/100ML
INJECTION, SOLUTION INTRAVENOUS CONTINUOUS
Status: DISCONTINUED | OUTPATIENT
Start: 2024-03-11 | End: 2024-03-12

## 2024-03-11 RX ORDER — ATORVASTATIN CALCIUM 10 MG/1
20 TABLET, FILM COATED ORAL NIGHTLY
Status: DISCONTINUED | OUTPATIENT
Start: 2024-03-11 | End: 2024-03-13 | Stop reason: HOSPADM

## 2024-03-11 RX ORDER — 0.9 % SODIUM CHLORIDE 0.9 %
1000 INTRAVENOUS SOLUTION INTRAVENOUS ONCE
Status: DISCONTINUED | OUTPATIENT
Start: 2024-03-11 | End: 2024-03-11

## 2024-03-11 RX ORDER — DIPHENHYDRAMINE HYDROCHLORIDE 50 MG/ML
25 INJECTION INTRAMUSCULAR; INTRAVENOUS ONCE
Status: COMPLETED | OUTPATIENT
Start: 2024-03-11 | End: 2024-03-11

## 2024-03-11 RX ORDER — POTASSIUM CHLORIDE 7.45 MG/ML
10 INJECTION INTRAVENOUS PRN
Status: DISCONTINUED | OUTPATIENT
Start: 2024-03-11 | End: 2024-03-13 | Stop reason: HOSPADM

## 2024-03-11 RX ORDER — SODIUM CHLORIDE 450 MG/100ML
INJECTION, SOLUTION INTRAVENOUS CONTINUOUS
Status: DISCONTINUED | OUTPATIENT
Start: 2024-03-11 | End: 2024-03-11

## 2024-03-11 RX ORDER — KETOROLAC TROMETHAMINE 15 MG/ML
15 INJECTION, SOLUTION INTRAMUSCULAR; INTRAVENOUS ONCE
Status: COMPLETED | OUTPATIENT
Start: 2024-03-11 | End: 2024-03-11

## 2024-03-11 RX ORDER — MAGNESIUM SULFATE IN WATER 40 MG/ML
2000 INJECTION, SOLUTION INTRAVENOUS PRN
Status: DISCONTINUED | OUTPATIENT
Start: 2024-03-11 | End: 2024-03-13 | Stop reason: HOSPADM

## 2024-03-11 RX ORDER — DEXTROSE, SODIUM CHLORIDE, SODIUM LACTATE, POTASSIUM CHLORIDE, AND CALCIUM CHLORIDE 5; .6; .31; .03; .02 G/100ML; G/100ML; G/100ML; G/100ML; G/100ML
INJECTION, SOLUTION INTRAVENOUS CONTINUOUS PRN
Status: DISCONTINUED | OUTPATIENT
Start: 2024-03-11 | End: 2024-03-12

## 2024-03-11 RX ORDER — SODIUM CHLORIDE 9 MG/ML
INJECTION, SOLUTION INTRAVENOUS CONTINUOUS
Status: DISCONTINUED | OUTPATIENT
Start: 2024-03-11 | End: 2024-03-11

## 2024-03-11 RX ORDER — POLYETHYLENE GLYCOL 3350 17 G/17G
17 POWDER, FOR SOLUTION ORAL DAILY PRN
Status: DISCONTINUED | OUTPATIENT
Start: 2024-03-11 | End: 2024-03-13 | Stop reason: HOSPADM

## 2024-03-11 RX ORDER — PANTOPRAZOLE SODIUM 40 MG/10ML
40 INJECTION, POWDER, LYOPHILIZED, FOR SOLUTION INTRAVENOUS DAILY
Status: DISCONTINUED | OUTPATIENT
Start: 2024-03-11 | End: 2024-03-12

## 2024-03-11 RX ORDER — 0.9 % SODIUM CHLORIDE 0.9 %
500 INTRAVENOUS SOLUTION INTRAVENOUS ONCE
Status: COMPLETED | OUTPATIENT
Start: 2024-03-11 | End: 2024-03-11

## 2024-03-11 RX ORDER — MAGNESIUM SULFATE IN WATER 40 MG/ML
2000 INJECTION, SOLUTION INTRAVENOUS ONCE
Status: COMPLETED | OUTPATIENT
Start: 2024-03-12 | End: 2024-03-12

## 2024-03-11 RX ORDER — SODIUM CHLORIDE, SODIUM LACTATE, POTASSIUM CHLORIDE, AND CALCIUM CHLORIDE .6; .31; .03; .02 G/100ML; G/100ML; G/100ML; G/100ML
2000 INJECTION, SOLUTION INTRAVENOUS ONCE
Status: DISCONTINUED | OUTPATIENT
Start: 2024-03-11 | End: 2024-03-12

## 2024-03-11 RX ORDER — ENOXAPARIN SODIUM 100 MG/ML
40 INJECTION SUBCUTANEOUS DAILY
Status: DISCONTINUED | OUTPATIENT
Start: 2024-03-11 | End: 2024-03-11 | Stop reason: CLARIF

## 2024-03-11 RX ORDER — ENOXAPARIN SODIUM 100 MG/ML
40 INJECTION SUBCUTANEOUS DAILY
Status: DISCONTINUED | OUTPATIENT
Start: 2024-03-11 | End: 2024-03-13 | Stop reason: HOSPADM

## 2024-03-11 RX ADMIN — DIPHENHYDRAMINE HYDROCHLORIDE 25 MG: 50 INJECTION, SOLUTION INTRAMUSCULAR; INTRAVENOUS at 15:48

## 2024-03-11 RX ADMIN — SODIUM CHLORIDE, SODIUM LACTATE, POTASSIUM CHLORIDE, CALCIUM CHLORIDE AND DEXTROSE MONOHYDRATE: 5; 600; 310; 30; 20 INJECTION, SOLUTION INTRAVENOUS at 21:45

## 2024-03-11 RX ADMIN — KETOROLAC TROMETHAMINE 15 MG: 15 INJECTION, SOLUTION INTRAMUSCULAR; INTRAVENOUS at 15:48

## 2024-03-11 RX ADMIN — INSULIN HUMAN 12 UNITS: 100 INJECTION, SOLUTION PARENTERAL at 19:02

## 2024-03-11 RX ADMIN — SODIUM CHLORIDE, POTASSIUM CHLORIDE, SODIUM LACTATE AND CALCIUM CHLORIDE: 600; 310; 30; 20 INJECTION, SOLUTION INTRAVENOUS at 21:33

## 2024-03-11 RX ADMIN — PANTOPRAZOLE SODIUM 40 MG: 40 INJECTION, POWDER, FOR SOLUTION INTRAVENOUS at 21:54

## 2024-03-11 RX ADMIN — SODIUM CHLORIDE 1000 ML: 9 INJECTION, SOLUTION INTRAVENOUS at 17:26

## 2024-03-11 RX ADMIN — SODIUM CHLORIDE 500 ML: 9 INJECTION, SOLUTION INTRAVENOUS at 15:47

## 2024-03-11 RX ADMIN — METOCLOPRAMIDE 10 MG: 5 INJECTION, SOLUTION INTRAMUSCULAR; INTRAVENOUS at 15:48

## 2024-03-11 RX ADMIN — ATORVASTATIN CALCIUM 20 MG: 10 TABLET, FILM COATED ORAL at 21:54

## 2024-03-11 ASSESSMENT — ENCOUNTER SYMPTOMS
NAUSEA: 1
EYES NEGATIVE: 1
RESPIRATORY NEGATIVE: 1
CONSTIPATION: 0
ABDOMINAL PAIN: 1
VOMITING: 0
ALLERGIC/IMMUNOLOGIC NEGATIVE: 1

## 2024-03-11 ASSESSMENT — PAIN SCALES - GENERAL
PAINLEVEL_OUTOF10: 0
PAINLEVEL_OUTOF10: 6

## 2024-03-12 LAB
ANION GAP SERPL CALCULATED.3IONS-SCNC: 10 MMOL/L (ref 7–16)
ANION GAP SERPL CALCULATED.3IONS-SCNC: 8 MMOL/L (ref 7–16)
BILIRUBIN URINE: NEGATIVE MG/DL
BLOOD, URINE: NEGATIVE
BUN SERPL-MCNC: 8 MG/DL (ref 6–23)
BUN SERPL-MCNC: 9 MG/DL (ref 6–23)
CALCIUM SERPL-MCNC: 8.6 MG/DL (ref 8.3–10.6)
CALCIUM SERPL-MCNC: 8.7 MG/DL (ref 8.3–10.6)
CHLORIDE BLD-SCNC: 109 MMOL/L (ref 99–110)
CHLORIDE BLD-SCNC: 111 MMOL/L (ref 99–110)
CLARITY: CLEAR
CO2: 20 MMOL/L (ref 21–32)
CO2: 22 MMOL/L (ref 21–32)
COLOR: YELLOW
COMMENT UA: ABNORMAL
CREAT SERPL-MCNC: 0.8 MG/DL (ref 0.9–1.3)
CREAT SERPL-MCNC: 0.8 MG/DL (ref 0.9–1.3)
GFR SERPL CREATININE-BSD FRML MDRD: >60 ML/MIN/1.73M2
GFR SERPL CREATININE-BSD FRML MDRD: >60 ML/MIN/1.73M2
GLUCOSE BLD-MCNC: 123 MG/DL (ref 70–99)
GLUCOSE BLD-MCNC: 123 MG/DL (ref 70–99)
GLUCOSE BLD-MCNC: 161 MG/DL (ref 70–99)
GLUCOSE BLD-MCNC: 163 MG/DL (ref 70–99)
GLUCOSE BLD-MCNC: 175 MG/DL (ref 70–99)
GLUCOSE BLD-MCNC: 185 MG/DL (ref 70–99)
GLUCOSE BLD-MCNC: 191 MG/DL (ref 70–99)
GLUCOSE BLD-MCNC: 205 MG/DL (ref 70–99)
GLUCOSE BLD-MCNC: 222 MG/DL (ref 70–99)
GLUCOSE BLD-MCNC: 244 MG/DL (ref 70–99)
GLUCOSE BLD-MCNC: 340 MG/DL (ref 70–99)
GLUCOSE BLD-MCNC: 348 MG/DL (ref 70–99)
GLUCOSE SERPL-MCNC: 133 MG/DL (ref 70–99)
GLUCOSE SERPL-MCNC: 173 MG/DL (ref 70–99)
GLUCOSE, URINE: >1000 MG/DL
KETONES, URINE: 15 MG/DL
LEUKOCYTE ESTERASE, URINE: NEGATIVE
MAGNESIUM: 2 MG/DL (ref 1.8–2.4)
MAGNESIUM: 2.6 MG/DL (ref 1.8–2.4)
NITRITE URINE, QUANTITATIVE: NEGATIVE
PH, URINE: 6 (ref 5–8)
PHOSPHORUS: 2.8 MG/DL (ref 2.5–4.9)
PHOSPHORUS: 3.3 MG/DL (ref 2.5–4.9)
POTASSIUM SERPL-SCNC: 3.3 MMOL/L (ref 3.5–5.1)
POTASSIUM SERPL-SCNC: 4.2 MMOL/L (ref 3.5–5.1)
PROTEIN UA: NEGATIVE MG/DL
SODIUM BLD-SCNC: 139 MMOL/L (ref 135–145)
SODIUM BLD-SCNC: 141 MMOL/L (ref 135–145)
SPECIFIC GRAVITY UA: 1.02 (ref 1–1.03)
UROBILINOGEN, URINE: 0.2 MG/DL (ref 0.2–1)

## 2024-03-12 PROCEDURE — 6360000002 HC RX W HCPCS: Performed by: PHYSICIAN ASSISTANT

## 2024-03-12 PROCEDURE — 2000000000 HC ICU R&B

## 2024-03-12 PROCEDURE — 81003 URINALYSIS AUTO W/O SCOPE: CPT

## 2024-03-12 PROCEDURE — 82962 GLUCOSE BLOOD TEST: CPT

## 2024-03-12 PROCEDURE — 6360000002 HC RX W HCPCS: Performed by: STUDENT IN AN ORGANIZED HEALTH CARE EDUCATION/TRAINING PROGRAM

## 2024-03-12 PROCEDURE — 80048 BASIC METABOLIC PNL TOTAL CA: CPT

## 2024-03-12 PROCEDURE — 94761 N-INVAS EAR/PLS OXIMETRY MLT: CPT

## 2024-03-12 PROCEDURE — 6370000000 HC RX 637 (ALT 250 FOR IP): Performed by: STUDENT IN AN ORGANIZED HEALTH CARE EDUCATION/TRAINING PROGRAM

## 2024-03-12 PROCEDURE — 83735 ASSAY OF MAGNESIUM: CPT

## 2024-03-12 PROCEDURE — 6370000000 HC RX 637 (ALT 250 FOR IP): Performed by: PHYSICIAN ASSISTANT

## 2024-03-12 PROCEDURE — C9113 INJ PANTOPRAZOLE SODIUM, VIA: HCPCS | Performed by: PHYSICIAN ASSISTANT

## 2024-03-12 PROCEDURE — 84100 ASSAY OF PHOSPHORUS: CPT

## 2024-03-12 PROCEDURE — 6370000000 HC RX 637 (ALT 250 FOR IP): Performed by: INTERNAL MEDICINE

## 2024-03-12 PROCEDURE — 2580000003 HC RX 258: Performed by: STUDENT IN AN ORGANIZED HEALTH CARE EDUCATION/TRAINING PROGRAM

## 2024-03-12 RX ORDER — PANTOPRAZOLE SODIUM 40 MG/1
40 TABLET, DELAYED RELEASE ORAL
Status: DISCONTINUED | OUTPATIENT
Start: 2024-03-13 | End: 2024-03-13 | Stop reason: HOSPADM

## 2024-03-12 RX ORDER — INSULIN GLARGINE 100 [IU]/ML
30 INJECTION, SOLUTION SUBCUTANEOUS NIGHTLY
Status: DISCONTINUED | OUTPATIENT
Start: 2024-03-12 | End: 2024-03-13

## 2024-03-12 RX ORDER — INSULIN LISPRO 100 [IU]/ML
0-4 INJECTION, SOLUTION INTRAVENOUS; SUBCUTANEOUS
Status: DISCONTINUED | OUTPATIENT
Start: 2024-03-12 | End: 2024-03-13 | Stop reason: HOSPADM

## 2024-03-12 RX ORDER — INSULIN LISPRO 100 [IU]/ML
0-8 INJECTION, SOLUTION INTRAVENOUS; SUBCUTANEOUS
Status: DISCONTINUED | OUTPATIENT
Start: 2024-03-12 | End: 2024-03-13 | Stop reason: HOSPADM

## 2024-03-12 RX ORDER — INSULIN LISPRO 100 [IU]/ML
10 INJECTION, SOLUTION INTRAVENOUS; SUBCUTANEOUS
Status: DISCONTINUED | OUTPATIENT
Start: 2024-03-12 | End: 2024-03-13

## 2024-03-12 RX ADMIN — INSULIN LISPRO 6 UNITS: 100 INJECTION, SOLUTION INTRAVENOUS; SUBCUTANEOUS at 17:46

## 2024-03-12 RX ADMIN — INSULIN LISPRO 10 UNITS: 100 INJECTION, SOLUTION INTRAVENOUS; SUBCUTANEOUS at 17:46

## 2024-03-12 RX ADMIN — ENOXAPARIN SODIUM 40 MG: 100 INJECTION SUBCUTANEOUS at 08:01

## 2024-03-12 RX ADMIN — POTASSIUM CHLORIDE 10 MEQ: 7.46 INJECTION, SOLUTION INTRAVENOUS at 12:43

## 2024-03-12 RX ADMIN — POTASSIUM BICARBONATE 40 MEQ: 782 TABLET, EFFERVESCENT ORAL at 00:05

## 2024-03-12 RX ADMIN — POTASSIUM & SODIUM PHOSPHATES POWDER PACK 280-160-250 MG 500 MG: 280-160-250 PACK at 21:45

## 2024-03-12 RX ADMIN — POTASSIUM CHLORIDE 10 MEQ: 7.46 INJECTION, SOLUTION INTRAVENOUS at 08:52

## 2024-03-12 RX ADMIN — POTASSIUM CHLORIDE 10 MEQ: 7.46 INJECTION, SOLUTION INTRAVENOUS at 07:33

## 2024-03-12 RX ADMIN — POTASSIUM & SODIUM PHOSPHATES POWDER PACK 280-160-250 MG 500 MG: 280-160-250 PACK at 00:32

## 2024-03-12 RX ADMIN — SODIUM CHLORIDE, SODIUM LACTATE, POTASSIUM CHLORIDE, CALCIUM CHLORIDE AND DEXTROSE MONOHYDRATE: 5; 600; 310; 30; 20 INJECTION, SOLUTION INTRAVENOUS at 04:31

## 2024-03-12 RX ADMIN — ATORVASTATIN CALCIUM 20 MG: 10 TABLET, FILM COATED ORAL at 21:43

## 2024-03-12 RX ADMIN — INSULIN LISPRO 10 UNITS: 100 INJECTION, SOLUTION INTRAVENOUS; SUBCUTANEOUS at 12:21

## 2024-03-12 RX ADMIN — POTASSIUM & SODIUM PHOSPHATES POWDER PACK 280-160-250 MG 500 MG: 280-160-250 PACK at 08:01

## 2024-03-12 RX ADMIN — INSULIN GLARGINE 30 UNITS: 100 INJECTION, SOLUTION SUBCUTANEOUS at 21:44

## 2024-03-12 RX ADMIN — POTASSIUM & SODIUM PHOSPHATES POWDER PACK 280-160-250 MG 500 MG: 280-160-250 PACK at 12:20

## 2024-03-12 RX ADMIN — INSULIN LISPRO 6 UNITS: 100 INJECTION, SOLUTION INTRAVENOUS; SUBCUTANEOUS at 12:20

## 2024-03-12 RX ADMIN — POTASSIUM CHLORIDE 10 MEQ: 7.46 INJECTION, SOLUTION INTRAVENOUS at 11:06

## 2024-03-12 RX ADMIN — POTASSIUM & SODIUM PHOSPHATES POWDER PACK 280-160-250 MG 500 MG: 280-160-250 PACK at 17:46

## 2024-03-12 RX ADMIN — MAGNESIUM SULFATE HEPTAHYDRATE 2000 MG: 40 INJECTION, SOLUTION INTRAVENOUS at 00:06

## 2024-03-12 RX ADMIN — PANTOPRAZOLE SODIUM 40 MG: 40 INJECTION, POWDER, FOR SOLUTION INTRAVENOUS at 08:01

## 2024-03-12 ASSESSMENT — PAIN SCALES - GENERAL
PAINLEVEL_OUTOF10: 0

## 2024-03-12 NOTE — CARE COORDINATION
Marco Antonio/[facility] discharge coordinator called back. Per his records, the appointment was set up for the pt but the pt never showed. Marco Antonio is able to set up another appointment, but if the pt does not show up again they will not set up any more appointments from the hospital and the pt will have to wait on the waiting list.    GIOVANI attempted to speak with pt in room, but he was unavailable using the restroom and other staff were waiting to speak with him. Marco Antonio leaves at 5p tonight. If this CM is unable to speak with the pt and return Marco Antonio's call, CM will f/u tomorrow.     1612 - Staff informed this CM that a family doctor the pt recently saw discontinued the pt's insulin. The pt does not know why. PCP was not with [facility] as the pt is not in their records. CM was once again unable to speak with the pt as the  was speaking with him.

## 2024-03-12 NOTE — CONSULTS
V2.0  History and Physical      Name:  Adrianna Landin /Age/Sex: 1985  (39 y.o. male)   MRN & CSN:  [id number] & [id number] Encounter Date/Time: 3/12/2024 4:23 PM EDT   Location:  -A PCP: No primary care provider on file.       Hospital Day: 2    Assessment and Plan:   Adrianna Landin is a 39 y.o. male with a pmh of  who presents with DKA, type 2, not at goal (HCC)    Hospital Problems             Last Modified POA    * (Principal) DKA, type 2, not at goal (HCC) 3/11/2024 Yes       DKA  Type II DM  Last hemoglobin A1c in 2000 20 to 11.3%  Patient has not been using insulin for the past month as he ran out of prescription and does not have PCP  Started on DKA protocol, bridged to basal bolus insulin  Endocrinology on board, appreciate recommendations  Now started on 30 units nightly of Lantus and 10 units lispro.  On medium dose sliding scale  Continue to monitor glucose and titrate insulin.      Hypokalemia  Hypomagnesemia  Hypophosphatemia  Replete    Disposition:   Current Living situation:   Expected Disposition:  Estimated D/C:     Diet ADULT DIET; Regular; 4 carb choices (60 gm/meal)   DVT Prophylaxis [x] Lovenox, []  Heparin, [] SCDs, [] Ambulation,  [] Eliquis, [] Xarelto, [] Coumadin   Code Status Full Code   Surrogate Decision Maker/ POA      History from:     patient    History of Present Illness:     Chief Complaint: DKA, type 2, not at goal (HCC)  Adrianna Landin is a 39 y.o. male with pmh of type II DM who presents with headaches.  According to the patient, he ran out of his prescriptions and has not been using insulin for the past month.  Evaluation in the emergency room showed patient was in DKA.  Insulin drip was started and was admitted to the ICU.  Internal medicine consulted for transition and continue TF care.    This morning he is doing well.  No headaches.  No nausea or vomiting.     Review of Systems: Need 10 Elements   Review of Systems    Negative if not mentioned

## 2024-03-12 NOTE — CARE COORDINATION
Chart reviewed. Pt is from home with family. Independent of his ADLs. Has insurance from his employment. Does not have a PCP. Pt has diabetes and states that he does not have the necessary supplies for its management. Pt was previously discharge in 11/2023 and connected with [facility] and given diabetic medication with [facility]. Pt was given Lantus and Hemalog. According to Cathie/Outpt pharmacy, the pt still has 3 more refils of 100 units of Lantus left although there may be a copay. CM called [facility] discharge coordinator. Left VM requesting call back re; connecting pt with PCP. Waiting on call back.    Discharge plan will remain home with family. CM working on connecting pt with [facility] for diabetic management. On flagged list with [facility].       03/12/24 5165   Service Assessment   Patient Orientation Alert and Oriented   Cognition Alert   History Provided By Medical Record   Primary Caregiver Self   Support Systems Family Members   Patient's Healthcare Decision Maker is: Legal Next of Kin   PCP Verified by CM No   Prior Functional Level Independent in ADLs/IADLs   Current Functional Level Independent in ADLs/IADLs   Can patient return to prior living arrangement Yes   Ability to make needs known: Good   Family able to assist with home care needs: Yes   Would you like for me to discuss the discharge plan with any other family members/significant others, and if so, who? 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

## 2024-03-12 NOTE — DISCHARGE INSTRUCTIONS
Becky meyer [facility] Mar[facility]  2-1-1:   Enfòmasyadam patel  [phone] o 2-1-1  [email or web address]    Harriet López:  koupon swati laron, sonya rosario, gadri sibvansyone, PRC  [phone]    [facility]:   laron, èd ak lwaye, rad ak b  [phone] or [phone]    Mickie flores:   laron, èd ak lwaye, rad ak b  [phone]    [facility] ak [address]:     [facility]  [address] [place] [address]  [phone]     chiki lópez yo  [address] [place] [address]  [phone]     [facility]:   [facility] frederickOhio[facility], klinik sante granmohemanth mata dapre revni ou  [address] [place], [place]   [phone] o [phone]    Prakashkory douglas [facility] Hospital[facility]:  asistans swati [facility]: [phone]  [email or web address] asistans swati [facility] CenterkrK[facility]erman[facility] Centeryon   [facility]: [facility] calin, [facility] Hospitaljosep mata selon revni ou  [address]   [place] [address]  [phone]    [employer] [place]:  Nitrisyon swati tibebe ak eun goldsteinwa selon revni  [address], [place] [id number] [phone]    [facility]:  ofri sèvis otobis nan [facility]. ADA ofri sèvis pòt an pòt swati moun ki gen andikap  [phone]    Max yon vwayaj:    [phone]    [facility]: transpò swati granmoun hany yo  [phone]

## 2024-03-12 NOTE — ED PROVIDER NOTES
EMERGENCY DEPARTMENT ENCOUNTER        Pt Name: Adrianna Landin  MRN: [id number]  Birthdate 1985  Date of evaluation: 3/11/2024  Provider: MELI CASTAÑEDA  PCP: No primary care provider on file.    ADOLFO. I have evaluated this patient.      Triage CHIEF COMPLAINT       Chief Complaint   Patient presents with    Headache     HISTORY OF PRESENT ILLNESS      Chief Complaint: Headache, polyuria polydipsia, malaise    Adrianna Landin is a 39 y.o. [place] male who presents to the ED via private vehicle with a chief complaint of a headache initially.  Patient states that he has has had a headache over the last 24 to 48 hours.  Gradual onset, no thunderclap presentation.  No nausea or vomiting.  No visual disturbances, no neck pain, no shortness of breath.  States he had history of headaches intermittently in the past.  He has been taking medication without significant improvement.  Patient also states that he is very thirsty and has been urinating frequently has history of diabetes, he feels that he is also constipated.  Patient denies any other symptoms no fevers chills, no recent sick contacts.  No recent trauma.  No abdominal pain.  No chest pain or shortness of breath no lightheadedness or dizziness no syncope or near syncope.  He denies any other significant medical history has been out of his diabetic medications for some time.    Nursing Notes were all reviewed and agreed with or any disagreements were addressed in the HPI.    REVIEW OF SYSTEMS     At least 10 systems reviewed and otherwise acutely negative except as in the [place].     PAST MEDICAL HISTORY   No past medical history on file.    SURGICAL HISTORY   No past surgical history on file.    CURRENTMEDICATIONS       Current Discharge Medication List        CONTINUE these medications which have NOT CHANGED    Details   insulin glargine (LANTUS) 100 UNIT/ML injection vial Inject 30 Units into the skin nightly  Qty: 10 mL, Refills: 3

## 2024-03-12 NOTE — PLAN OF CARE
Problem: Discharge Planning  Goal: Discharge to home or other facility with appropriate resources  Outcome: Progressing  Flowsheets (Taken 3/12/2024 160)  Discharge to home or other facility with appropriate resources:   Identify barriers to discharge with patient and caregiver   Arrange for needed discharge resources and transportation as appropriate   Identify discharge learning needs (meds, wound care, etc)   Arrange for interpreters to assist at discharge as needed   Refer to discharge planning if patient needs post-hospital services based on physician order or complex needs related to functional status, cognitive ability or social support system     Problem: Pain  Goal: Verbalizes/displays adequate comfort level or baseline comfort level  Outcome: Progressing

## 2024-03-13 VITALS
WEIGHT: 175.49 LBS | OXYGEN SATURATION: 98 % | SYSTOLIC BLOOD PRESSURE: 99 MMHG | HEIGHT: 64 IN | RESPIRATION RATE: 22 BRPM | HEART RATE: 82 BPM | DIASTOLIC BLOOD PRESSURE: 69 MMHG | BODY MASS INDEX: 29.96 KG/M2 | TEMPERATURE: 97.8 F

## 2024-03-13 LAB
ANION GAP SERPL CALCULATED.3IONS-SCNC: 10 MMOL/L (ref 7–16)
BUN SERPL-MCNC: 7 MG/DL (ref 6–23)
CALCIUM SERPL-MCNC: 8.4 MG/DL (ref 8.3–10.6)
CHLORIDE BLD-SCNC: 102 MMOL/L (ref 99–110)
CO2: 24 MMOL/L (ref 21–32)
CREAT SERPL-MCNC: 0.7 MG/DL (ref 0.9–1.3)
EKG ATRIAL RATE: 73 BPM
EKG DIAGNOSIS: NORMAL
EKG P AXIS: 35 DEGREES
EKG P-R INTERVAL: 146 MS
EKG Q-T INTERVAL: 370 MS
EKG QRS DURATION: 94 MS
EKG QTC CALCULATION (BAZETT): 407 MS
EKG R AXIS: 71 DEGREES
EKG T AXIS: 45 DEGREES
EKG VENTRICULAR RATE: 73 BPM
GFR SERPL CREATININE-BSD FRML MDRD: >60 ML/MIN/1.73M2
GLUCOSE BLD-MCNC: 148 MG/DL (ref 70–99)
GLUCOSE BLD-MCNC: 243 MG/DL (ref 70–99)
GLUCOSE BLD-MCNC: 282 MG/DL (ref 70–99)
GLUCOSE SERPL-MCNC: 293 MG/DL (ref 70–99)
MAGNESIUM: 1.6 MG/DL (ref 1.8–2.4)
PHOSPHORUS: 3.7 MG/DL (ref 2.5–4.9)
POTASSIUM SERPL-SCNC: 3.5 MMOL/L (ref 3.5–5.1)
SODIUM BLD-SCNC: 136 MMOL/L (ref 135–145)

## 2024-03-13 PROCEDURE — 6360000002 HC RX W HCPCS: Performed by: PHYSICIAN ASSISTANT

## 2024-03-13 PROCEDURE — 83735 ASSAY OF MAGNESIUM: CPT

## 2024-03-13 PROCEDURE — 6370000000 HC RX 637 (ALT 250 FOR IP): Performed by: REGISTERED NURSE

## 2024-03-13 PROCEDURE — 6370000000 HC RX 637 (ALT 250 FOR IP): Performed by: INTERNAL MEDICINE

## 2024-03-13 PROCEDURE — 93010 ELECTROCARDIOGRAM REPORT: CPT | Performed by: INTERNAL MEDICINE

## 2024-03-13 PROCEDURE — 82962 GLUCOSE BLOOD TEST: CPT

## 2024-03-13 PROCEDURE — 6370000000 HC RX 637 (ALT 250 FOR IP): Performed by: STUDENT IN AN ORGANIZED HEALTH CARE EDUCATION/TRAINING PROGRAM

## 2024-03-13 PROCEDURE — 36415 COLL VENOUS BLD VENIPUNCTURE: CPT

## 2024-03-13 PROCEDURE — 84100 ASSAY OF PHOSPHORUS: CPT

## 2024-03-13 PROCEDURE — 80048 BASIC METABOLIC PNL TOTAL CA: CPT

## 2024-03-13 RX ORDER — INSULIN LISPRO 100 [IU]/ML
15 INJECTION, SOLUTION INTRAVENOUS; SUBCUTANEOUS
Qty: 13.5 ML | Refills: 0 | Status: SHIPPED | OUTPATIENT
Start: 2024-03-13 | End: 2024-03-13

## 2024-03-13 RX ORDER — INSULIN LISPRO 100 [IU]/ML
10 INJECTION, SOLUTION INTRAVENOUS; SUBCUTANEOUS
Qty: 9 ML | Refills: 0 | Status: SHIPPED | OUTPATIENT
Start: 2024-03-13 | End: 2024-04-12

## 2024-03-13 RX ORDER — INSULIN GLARGINE 100 [IU]/ML
40 INJECTION, SOLUTION SUBCUTANEOUS NIGHTLY
Status: DISCONTINUED | OUTPATIENT
Start: 2024-03-13 | End: 2024-03-13 | Stop reason: HOSPADM

## 2024-03-13 RX ORDER — PANTOPRAZOLE SODIUM 40 MG/1
40 TABLET, DELAYED RELEASE ORAL
Qty: 30 TABLET | Refills: 3 | Status: SHIPPED | OUTPATIENT
Start: 2024-03-14

## 2024-03-13 RX ORDER — METFORMIN HYDROCHLORIDE 500 MG/1
500 TABLET, EXTENDED RELEASE ORAL DAILY
Qty: 30 TABLET | Refills: 0 | Status: SHIPPED | OUTPATIENT
Start: 2024-03-13 | End: 2024-04-12

## 2024-03-13 RX ORDER — MAGNESIUM SULFATE IN WATER 40 MG/ML
2000 INJECTION, SOLUTION INTRAVENOUS ONCE
Status: DISCONTINUED | OUTPATIENT
Start: 2024-03-13 | End: 2024-03-13

## 2024-03-13 RX ORDER — INSULIN GLARGINE 100 [IU]/ML
40 INJECTION, SOLUTION SUBCUTANEOUS NIGHTLY
Qty: 10 ML | Refills: 3 | Status: SHIPPED | OUTPATIENT
Start: 2024-03-13

## 2024-03-13 RX ORDER — INSULIN LISPRO 100 [IU]/ML
15 INJECTION, SOLUTION INTRAVENOUS; SUBCUTANEOUS
Status: DISCONTINUED | OUTPATIENT
Start: 2024-03-13 | End: 2024-03-13 | Stop reason: HOSPADM

## 2024-03-13 RX ADMIN — PANTOPRAZOLE SODIUM 40 MG: 40 TABLET, DELAYED RELEASE ORAL at 05:12

## 2024-03-13 RX ADMIN — ENOXAPARIN SODIUM 40 MG: 100 INJECTION SUBCUTANEOUS at 09:03

## 2024-03-13 RX ADMIN — INSULIN HUMAN 10 UNITS: 100 INJECTION, SUSPENSION SUBCUTANEOUS at 09:02

## 2024-03-13 RX ADMIN — INSULIN LISPRO 15 UNITS: 100 INJECTION, SOLUTION INTRAVENOUS; SUBCUTANEOUS at 13:46

## 2024-03-13 RX ADMIN — INSULIN LISPRO 15 UNITS: 100 INJECTION, SOLUTION INTRAVENOUS; SUBCUTANEOUS at 09:04

## 2024-03-13 RX ADMIN — MAGNESIUM SULFATE HEPTAHYDRATE 2000 MG: 40 INJECTION, SOLUTION INTRAVENOUS at 05:11

## 2024-03-13 RX ADMIN — POTASSIUM & SODIUM PHOSPHATES POWDER PACK 280-160-250 MG 500 MG: 280-160-250 PACK at 09:02

## 2024-03-13 RX ADMIN — INSULIN LISPRO 4 UNITS: 100 INJECTION, SOLUTION INTRAVENOUS; SUBCUTANEOUS at 09:03

## 2024-03-13 NOTE — DISCHARGE SUMMARY
V2.0  Discharge Summary    Name:  Adrianna Landin /Age/Sex: 1985 (39 y.o. male)   Admit Date: 3/11/2024  Discharge Date: 3/13/24    MRN & CSN:  [id number] & [id number] Encounter Date and Time 3/13/24 11:53 AM EDT    Attending:  Andres Reese,* Discharging Provider: Andres Nguyen MD       Hospital Course:     Brief HPI:Adrianna Landin is a 39 y.o. male with a history of DM who presented to [facility] 3/11/2024 with headache found to be in DKA     Brief Problem Based Course:     DKA  Type II DM  Last hemoglobin A1c in 2000 20 to 11.3%  Patient has not been using insulin for the past month as he ran out of prescription and does not have PCP  Started on DKA protocol, bridged to basal bolus insulin  Endocrinology on board, appreciate recommendations  Now started on 30 units nightly of Lantus and 10 units lispro.  On medium dose sliding scale  Continue to monitor glucose and titrate insulin.    Patient doing better. Denies any abdominal pain, n/v. Tolerating diet. AG closed. Will DC on Lantus 40 Un and Lispro 10 U TID. Advised to f/u as OP        Hypokalemia- resolved    Hypomagnesemia - 1.6 this am. Given 2 gm today before DC    Hypophosphatemia- resolved         The patient expressed appropriate understanding of, and agreement with the discharge recommendations, medications, and plan.     Consults this admission:  IP CONSULT TO CRITICAL CARE  IP CONSULT TO ENDOCRINOLOGY  IP CONSULT TO DIETITIAN  IP CONSULT TO DIABETES EDUCATOR    Discharge Diagnosis:   DKA, type 2, not at goal (HCC)      Discharge Instruction:   Follow up appointments: julia  Primary care physician: No primary care provider on file. within 2 weeks  Diet: diabetic diet   Activity: activity as tolerated  Disposition: Discharged to:   [x]Home, []HHC, []SNF, []Acute Rehab, []Hospice   Condition on discharge: Stable  Labs and Tests to be Followed up as an outpatient by PCP or Specialist:     Discharge Medications:

## 2024-03-13 NOTE — CARE COORDINATION
Discussed in IDR. Contacted [facility]. [facility]  Ismael Stallworth RN

## 2024-03-13 NOTE — PLAN OF CARE
Problem: Discharge Planning  Goal: Discharge to home or other facility with appropriate resources  3/13/2024 0422 by Marilynn Ortiz RN  Outcome: Not Progressing  3/12/2024 1803 by Deisy Castelan RN  Outcome: Progressing  Flowsheets (Taken 3/12/2024 1609)  Discharge to home or other facility with appropriate resources:   Identify barriers to discharge with patient and caregiver   Arrange for needed discharge resources and transportation as appropriate   Identify discharge learning needs (meds, wound care, etc)   Arrange for interpreters to assist at discharge as needed   Refer to discharge planning if patient needs post-hospital services based on physician order or complex needs related to functional status, cognitive ability or social support system     Problem: Discharge Planning  Goal: Discharge to home or other facility with appropriate resources  3/13/2024 0422 by Marilynn Ortiz RN  Outcome: Not Progressing  3/12/2024 1803 by Deisy Castelan RN  Outcome: Progressing  Flowsheets (Taken 3/12/2024 1609)  Discharge to home or other facility with appropriate resources:   Identify barriers to discharge with patient and caregiver   Arrange for needed discharge resources and transportation as appropriate   Identify discharge learning needs (meds, wound care, etc)   Arrange for interpreters to assist at discharge as needed   Refer to discharge planning if patient needs post-hospital services based on physician order or complex needs related to functional status, cognitive ability or social support system

## 2024-03-13 NOTE — FLOWSHEET NOTE
Pt d/c at this time.  Natasha Post  accessed via video remote system [employer] # [id number].  Pt states he understands.

## 2024-03-14 NOTE — CONSULTS
Endocrinology   Consult Note  3/11/2024 12:53 PM     Primary Care provider: No primary care provider on file.     Referring physician:  No admitting provider for patient encounter.     Dear Doctor  Jaimie    Thank You for the Consult     Pt. Was Admitted for : High blood glucose with DKA    Reason for Consult: Better control of blood glucose      History Obtained From:  Patient/ EMR       HISTORY OF PRESENT ILLNESS:                The patient is a 39 y.o. male with significant past medical history of diabetes mellitus supposedly on an insulin and oral hypoglycemic drugs but has not been taking as he did not get those medicines as outpatient after discharge from the hospital last time.  Blood glucoses are running higher so he came to the hospital and was found to be DKA.  I was  consulted for better control of blood glucose.       ROS:   Pt's ROS done in detail.  Abnormal ROS are noted in Medical and Surgical History Section below:     Other Medical History:    No past medical history on file.  Surgical History:    No past surgical history on file.    Allergies:  Patient has no known allergies.    Family History:   No family history on file.  REVIEW OF SYSTEMS:  Review of System Done as noted above     PHYSICAL EXAM:      Vitals:    BP 99/69   Pulse 82   Temp 97.8 °F (36.6 °C) (Oral)   Resp 22   Ht 1.625 m (5' 3.98\")   Wt 79.6 kg (175 lb 7.8 oz)   SpO2 98%   BMI 30.14 kg/m²     CONSTITUTIONAL:  awake, alert, cooperative, appears stated age   EYES:  vision intact Fundoscopic Exam not performed   ENT:Normal  NECK:  Supple, No JVD.   Thyroid Exam:Normal   LUNGS:  Has Vesicular Breath Sounds,   CARDIOVASCULAR:  Normal apical impulse, regular rate and rhythm, normal S1 and S2, no S3 or S4, and has no  murmur   ABDOMEN:  No scars, normal bowel sounds, soft, non-distended, non-tender, no masses palpated, no hepatolienomegaly  Musculoskeletal: Normal  Extremities: Normal, peripheral pulses normal, , has no edema

## 2024-03-14 NOTE — PROGRESS NOTES
03/12/24 1609   Encounter Summary   Encounter Overview/Reason  Initial Encounter   Service Provided For: Patient   Referral/Consult From: [facility]   Support System Unknown   Last Encounter  03/12/24  (Patient also speaks Lebanese. Patient said that things are going fine. Patient did not express any needs at this time.)   Complexity of Encounter Low   Begin Time 1609   End Time  1622   Total Time Calculated 13 min   Spiritual/Emotional needs   Type Spiritual Support   Grief, Loss, and Adjustments   Type Adjustment to illness   Assessment/Intervention/Outcome   Assessment Coping;Hopeful;Peaceful       
4 Eyes Skin Assessment     NAME:  Adrianna Landin  YOB: 1985  MEDICAL RECORD NUMBER:  [id number]    The patient is being assessed for  Admission    I agree that at least one RN has performed a thorough Head to Toe Skin Assessment on the patient. ALL assessment sites listed below have been assessed.      Areas assessed by both nurses:    Head, Face, Ears, Shoulders, Back, Chest, Arms, Elbows, Hands, Sacrum. Buttock, Coccyx, Ischium, Legs. Feet and Heels, and Under Medical Devices         Does the Patient have a Wound? No noted wound(s)       Jesus Prevention initiated by RN: No  Wound Care Orders initiated by RN: No    Pressure Injury (Stage 3,4, Unstageable, DTI, NWPT, and Complex wounds) if present, place Wound referral order by RN under : No    New Ostomies, if present place, Ostomy referral order under : No     Nurse 1 eSignature: Electronically signed by DARRICK MOREL RN on 3/12/24 at 5:53 AM EDT    **SHARE this note so that the co-signing nurse can place an eSignature**    Nurse 2 eSignature: Electronically signed by Amber Pennington RN on 3/12/24 at 6:51 AM EDT   
I have personally seen and examined the patient independently. I have reviewed the patient's available data,including medical history and recent test results. I assume responsibility for the patient's care, management. I reviewed and discussed note as documented by ADOLFO.  I agree with the physical exam findings, assessment and plan. My documented MDM is a substantive portion of the supervisory note.     DKA, type II DM, not at goal    IV Insulin, fluids, Endocrinology consultation  DVT, Ulcer prophylaxis.    Once free of Insulin infusion, transfer out of ICU, transition to Hospital medicine service.    Complex decisions required for evaluation, management.    E Cordasco  [phone]  
Nutrition Education    Admit and treated for DKA with hx DM. Receptive to discussion on meal plan at home. Does work 12 hour shifts, 2p-2a. May eat only 2 meals each day. Reviewed CHO controlled meal plan. Encouraged to monitor portions of higher CHO foods. May drink sugary drinks at times when feeling tired but usually consumes only water. Agreeable to pat attention to food choices. Has glucometer at home but does not know how to use. Discussed with nursing possible plan to educate on meter use. Will continue to follow up during stay.     Educated on CHO controlled meal plan  Learners: Patient  Readiness: Acceptance  Method: Explanation, Handout, and -Екатерина #[id number], DM meal plan handout from [employer] in [place]  Response: Verbalizes Understanding and Needs Reinforcement  Contact name and number provided.    Tabby Mccracken RD, LD  Contact Number: [phone]    
Reported VBG to Candelario GARRISON  
 SpO2 98%   BMI 30.14 kg/m²   General appearance: alert and cooperative with exam  Neck: no JVD or bruit  Thyroid : Normal lobes   Lungs: Has Vesicular Breath sounds   Heart:  regular rate and rhythm  Abdomen: soft, non-tender; bowel sounds normal; no masses,  no organomegaly  Musculoskeletal: Normal  Extremities: extremities normal, , no edema  Neurologic:  Awake, alert, oriented to name, place and time.  Cranial nerves II-XII are grossly intact.  Motor is  intact.  Sensory is intact.,  and gait is normal.    Assessment:     Patient Active Problem List:     DKA, type 2, not at goal (HCC)     2 diabetes mellitus      Plan:     Reviewed POC blood glucose . Labs and X ray results   Reviewed Current Medicines   Continue on meal/ Correction bolus Humalog/ Basal Lantus Insulin regime  Monitor Blood glucose frequently   Modified  the dose of Insulin/ other medicines as needed   Patient is discharged home later in the day make sure he gets his insulin both Lantus and Humalog pen the pharmacy  Will follow     .     DARCY Cutler MD, MD  
  Result Value Ref Range    Phosphorus 2.8 2.5 - 4.9 MG/DL   Basic Metabolic Panel    Collection Time: 03/12/24  5:58 AM   Result Value Ref Range    Sodium 141 135 - 145 MMOL/L    Potassium 3.3 (L) 3.5 - 5.1 MMOL/L    Chloride 111 (H) 99 - 110 mMol/L    CO2 22 21 - 32 MMOL/L    Anion Gap 8 7 - 16    Glucose 133 (H) 70 - 99 MG/DL    BUN 8 6 - 23 MG/DL    Creatinine 0.8 (L) 0.9 - 1.3 MG/DL    Est, Glom Filt Rate >60 >60 mL/min/1.73m2    Calcium 8.6 8.3 - 10.6 MG/DL   POCT Glucose    Collection Time: 03/12/24  5:58 AM   Result Value Ref Range    POC Glucose 123 (H) 70 - 99 MG/DL   POCT Glucose    Collection Time: 03/12/24  7:01 AM   Result Value Ref Range    POC Glucose 123 (H) 70 - 99 MG/DL   POCT Glucose    Collection Time: 03/12/24  8:05 AM   Result Value Ref Range    POC Glucose 191 (H) 70 - 99 MG/DL        Imaging/Diagnostics Last 24 Hours   CT HEAD WO CONTRAST    Result Date: 3/11/2024  CT OF THE HEAD WITHOUT CONTRAST: HISTORY: New onset headaches. TECHNIQUE: Thin section axial images were obtained per routine protocol. No contrast given. Coronal and sagittal reformatting performed. Up-to-date CT equipment and radiation dose reduction techniques were utilized. COMPARISON: None. FINDINGS: The ventricular system is within normal limits. No extra-axial collections, mass-effect or midline shift is seen. There are no significant abnormal areas of brain attenuation, and there is no evidence of acute intracranial hemorrhage. The bony calvarium is intact. Paranasal sinuses and mastoid air cell complexes are aerated bilaterally.     Negative noncontrast CT of the head. Electronically signed by Winnie Myrick MD      Electronically signed by CHEY Talbert - CNP on 3/12/2024 at 11:51 AM

## 2024-03-18 LAB
ESTIMATED AVERAGE GLUCOSE: 384 MG/DL
HBA1C MFR BLD: 15 % (ref 4.2–6.3)

## 2024-04-21 ENCOUNTER — HOSPITAL ENCOUNTER (EMERGENCY)
Age: 39
Discharge: HOME | End: 2024-04-21
Payer: COMMERCIAL

## 2024-04-21 VITALS
WEIGHT: 150 LBS | OXYGEN SATURATION: 100 % | DIASTOLIC BLOOD PRESSURE: 79 MMHG | SYSTOLIC BLOOD PRESSURE: 116 MMHG | RESPIRATION RATE: 11 BRPM | BODY MASS INDEX: 25.77 KG/M2 | HEART RATE: 65 BPM | TEMPERATURE: 97.2 F

## 2024-04-21 DIAGNOSIS — N47.2 PARAPHIMOSIS: ICD-10-CM

## 2024-04-21 DIAGNOSIS — N48.1 BALANITIS: Primary | ICD-10-CM

## 2024-04-21 DIAGNOSIS — R73.9 HYPERGLYCEMIA: ICD-10-CM

## 2024-04-21 LAB
ALBUMIN SERPL-MCNC: 4.6 GM/DL (ref 3.4–5)
ALP BLD-CCNC: 94 IU/L (ref 40–128)
ALT SERPL-CCNC: 25 U/L (ref 10–40)
ANION GAP SERPL CALCULATED.3IONS-SCNC: 16 MMOL/L (ref 7–16)
AST SERPL-CCNC: 20 IU/L (ref 15–37)
BASOPHILS ABSOLUTE: 0 K/CU MM
BASOPHILS RELATIVE PERCENT: 0.5 % (ref 0–1)
BILIRUB SERPL-MCNC: 0.4 MG/DL (ref 0–1)
BILIRUBIN URINE: NEGATIVE MG/DL
BLOOD, URINE: NEGATIVE
BUN SERPL-MCNC: 16 MG/DL (ref 6–23)
CALCIUM SERPL-MCNC: 9.1 MG/DL (ref 8.3–10.6)
CHLORIDE BLD-SCNC: 94 MMOL/L (ref 99–110)
CLARITY: CLEAR
CO2: 19 MMOL/L (ref 21–32)
COLOR: YELLOW
COMMENT UA: ABNORMAL
CREAT SERPL-MCNC: 0.8 MG/DL (ref 0.9–1.3)
DIFFERENTIAL TYPE: ABNORMAL
EOSINOPHILS ABSOLUTE: 0.1 K/CU MM
EOSINOPHILS RELATIVE PERCENT: 1.2 % (ref 0–3)
GFR SERPL CREATININE-BSD FRML MDRD: >90 ML/MIN/1.73M2
GLUCOSE SERPL-MCNC: 332 MG/DL (ref 70–99)
GLUCOSE, URINE: >1000 MG/DL
HCT VFR BLD CALC: 45 % (ref 42–52)
HEMOGLOBIN: 14.6 GM/DL (ref 13.5–18)
IMMATURE NEUTROPHIL %: 0.2 % (ref 0–0.43)
KETONES, URINE: >80 MG/DL
LEUKOCYTE ESTERASE, URINE: NEGATIVE
LYMPHOCYTES ABSOLUTE: 2 K/CU MM
LYMPHOCYTES RELATIVE PERCENT: 34.7 % (ref 24–44)
MCH RBC QN AUTO: 27.3 PG (ref 27–31)
MCHC RBC AUTO-ENTMCNC: 32.4 % (ref 32–36)
MCV RBC AUTO: 84.1 FL (ref 78–100)
MONOCYTES ABSOLUTE: 0.5 K/CU MM
MONOCYTES RELATIVE PERCENT: 8 % (ref 0–4)
NEUTROPHILS RELATIVE PERCENT: 55.4 % (ref 36–66)
NITRITE URINE, QUANTITATIVE: NEGATIVE
NUCLEATED RBC %: 0 %
PDW BLD-RTO: 14.9 % (ref 11.7–14.9)
PH, URINE: 5.5 (ref 5–8)
PLATELET # BLD: 350 K/CU MM (ref 140–440)
PMV BLD AUTO: 10.3 FL (ref 7.5–11.1)
POTASSIUM SERPL-SCNC: 4.6 MMOL/L (ref 3.5–5.1)
PROTEIN UA: NEGATIVE MG/DL
RBC # BLD: 5.35 M/CU MM (ref 4.6–6.2)
SEGMENTED NEUTROPHILS ABSOLUTE COUNT: 3.2 K/CU MM
SODIUM BLD-SCNC: 129 MMOL/L (ref 135–145)
SPECIFIC GRAVITY UA: >1.03 (ref 1–1.03)
TOTAL IMMATURE NEUTOROPHIL: 0.01 K/CU MM
TOTAL NUCLEATED RBC: 0 K/CU MM
TOTAL PROTEIN: 7.5 GM/DL (ref 6.4–8.2)
UROBILINOGEN, URINE: 0.2 MG/DL (ref 0.2–1)
WBC # BLD: 5.7 K/CU MM (ref 4–10.5)

## 2024-04-21 PROCEDURE — 85025 COMPLETE CBC W/AUTO DIFF WBC: CPT

## 2024-04-21 PROCEDURE — 2580000003 HC RX 258: Performed by: PHYSICIAN ASSISTANT

## 2024-04-21 PROCEDURE — 81003 URINALYSIS AUTO W/O SCOPE: CPT

## 2024-04-21 PROCEDURE — 6360000002 HC RX W HCPCS: Performed by: PHYSICIAN ASSISTANT

## 2024-04-21 PROCEDURE — 87491 CHLMYD TRACH DNA AMP PROBE: CPT

## 2024-04-21 PROCEDURE — 80053 COMPREHEN METABOLIC PANEL: CPT

## 2024-04-21 PROCEDURE — 99284 EMERGENCY DEPT VISIT MOD MDM: CPT

## 2024-04-21 PROCEDURE — 87591 N.GONORRHOEAE DNA AMP PROB: CPT

## 2024-04-21 PROCEDURE — 96361 HYDRATE IV INFUSION ADD-ON: CPT

## 2024-04-21 PROCEDURE — 96374 THER/PROPH/DIAG INJ IV PUSH: CPT

## 2024-04-21 PROCEDURE — 51798 US URINE CAPACITY MEASURE: CPT

## 2024-04-21 RX ORDER — LIDOCAINE HYDROCHLORIDE 10 MG/ML
10 INJECTION, SOLUTION INFILTRATION; PERINEURAL ONCE
Status: DISCONTINUED | OUTPATIENT
Start: 2024-04-21 | End: 2024-04-21 | Stop reason: HOSPADM

## 2024-04-21 RX ORDER — CLOTRIMAZOLE 1 %
CREAM (GRAM) TOPICAL
Qty: 1 G | Refills: 0 | Status: SHIPPED | OUTPATIENT
Start: 2024-04-21 | End: 2024-04-28

## 2024-04-21 RX ORDER — FENTANYL CITRATE 50 UG/ML
25 INJECTION, SOLUTION INTRAMUSCULAR; INTRAVENOUS ONCE
Status: COMPLETED | OUTPATIENT
Start: 2024-04-21 | End: 2024-04-21

## 2024-04-21 RX ORDER — 0.9 % SODIUM CHLORIDE 0.9 %
1000 INTRAVENOUS SOLUTION INTRAVENOUS ONCE
Status: COMPLETED | OUTPATIENT
Start: 2024-04-21 | End: 2024-04-21

## 2024-04-21 RX ADMIN — FENTANYL CITRATE 25 MCG: 50 INJECTION INTRAMUSCULAR; INTRAVENOUS at 11:19

## 2024-04-21 RX ADMIN — SODIUM CHLORIDE 1000 ML: 9 INJECTION, SOLUTION INTRAVENOUS at 12:28

## 2024-04-21 NOTE — CONSULTS
[address] [address]   Urology Consult Note  [facility] 1 2 3 4 5    Date: 2024   Patient: Adrianna Landin   : 1985   DOA: 2024   MRN: [id number]   ROOM#: [id number]/[id number]       CHIEF COMPLAINT: paraphimosis      HISTORY OF PRESENT ILLNESS:      Adrianna Landin is a 39 y.o. male with PMH of DM on insulin, GERD, HLD who presented with paraphimosis.    He reports paraphimosis x 1 week. Having penile pain. No significant LUTS or GH. No fevers. Afebrile and hemodynamically stable.    PVR 36cc    Past Medical History:    History reviewed. No pertinent past medical history.    Past Surgical History:    History reviewed. No pertinent surgical history.    Current Medications:   Current Facility-Administered Medications: sodium chloride 0.9 % bolus 1,000 mL, 1,000 mL, IntraVENous, Once  lidocaine 1 % injection 10 mL, 10 mL, IntraDERmal, Once    Allergies:    Patient has no known allergies.    Social History:   TOBACCO:   reports that he has never smoked. He has never used smokeless tobacco.  ETOH:   has no history on file for alcohol use.  DRUGS:   has no history on file for drug use.    Family History:   History reviewed. No pertinent family history.  No known history of prostate, bladder or renal cancer.    REVIEW OF SYSTEMS:     Constitutional symptoms:(-) fever, (-) chills (-) headache  Eyes: (-) blurred vision, (-) double vision  (-) pain  Allergic/Immunologic:  (-) hay fever  Neurological: (-) tremors, (-) dizzy spells  (-) numbness/tingling  Endocrine: (-)  excessive thirst, (-) feeling too hot/cold (-)  tired/sluggish  Gastrointestinal:(-)  abdominal pain   (-) nausea/vomiting (-) indigestion  Cardiovascular: (-) chest pain, (-) varicose veins (-) HTN  Integumentary: (-)  skin rash (-)  boils (-) persistent itch  Musculoskeletal: (-) joint pain, (-) neck pain (-) back pain  Ear/Nose/Throat/Mouth: (-) ear infection (-) sore throat (-) sinus problems  Genitourinary: (-) hematuria, (-)

## 2024-04-24 LAB
C TRACH RRNA SPEC QL NAA+PROBE: POSITIVE
N GONORRHOEA RRNA SPEC QL NAA+PROBE: NEGATIVE

## 2024-04-26 ENCOUNTER — TELEPHONE (OUTPATIENT)
Dept: PHARMACY | Age: 39
End: 2024-04-26

## 2024-04-26 NOTE — PROGRESS NOTES
Pharmacy Note  ED Culture Follow-up    Adrianna Landin is a 39 y.o. male.     Allergies: Patient has no known allergies.     Labs:  Lab Results   Component Value Date    BUN 16 04/21/2024    CREATININE 0.8 (L) 04/21/2024    WBC 5.7 04/21/2024     Estimated Creatinine Clearance: 106 mL/min (A) (based on SCr of 0.8 mg/dL (L)).    Current antimicrobials:   None    ASSESSMENT:  Micro results:   Genital culture: positive for C. Trachonmatis     PLAN:  Need for intervention: Yes  Discussed with: Dr. Contreras  Chosen treatment:    Start Doxycycline 100 mg BID x 7 days    Patient response:   Call attempt #3, did not reach patient. Unable to reach patient after 3 call attempts, sent letter on 04/26/24.    Called/sent in prescription to: Not applicable    Please call with any questions. Ext. [phone]    Alexia Knight RPH, PharmD 3:46 PM 4/26/2024

## 2024-04-26 NOTE — TELEPHONE ENCOUNTER
Pharmacy Note  ED Culture Follow-up    Adrianna Landin is a 39 y.o. male.     Allergies: Patient has no known allergies.     Labs:  Lab Results   Component Value Date    BUN 16 04/21/2024    CREATININE 0.8 (L) 04/21/2024    WBC 5.7 04/21/2024     Estimated Creatinine Clearance: 106 mL/min (A) (based on SCr of 0.8 mg/dL (L)).    Current antimicrobials:   None    ASSESSMENT:  Micro results:   Genital culture: positive for C. Trachonmatis     PLAN:  Need for intervention: Yes  Discussed with: Dr. Contreras  Chosen treatment:    Start Doxycycline 100 mg BID x 7 days    Patient response:   Call attempt #3, did not reach patient. Unable to reach patient after 3 call attempts, sent letter on 04/26/24.    Called/sent in prescription to: Not applicable    Please call with any questions. Ext. [phone]    Alexia Knight RPH, PharmD 3:46 PM 4/26/2024

## 2024-06-09 ENCOUNTER — HOSPITAL ENCOUNTER (INPATIENT)
Age: 39
LOS: 1 days | Discharge: HOME | DRG: 639 | End: 2024-06-10
Attending: INTERNAL MEDICINE | Admitting: STUDENT IN AN ORGANIZED HEALTH CARE EDUCATION/TRAINING PROGRAM
Payer: COMMERCIAL

## 2024-06-09 DIAGNOSIS — E11.10 DKA, TYPE 2, NOT AT GOAL (HCC): Primary | ICD-10-CM

## 2024-06-09 PROBLEM — R73.9 HYPERGLYCEMIA: Status: ACTIVE | Noted: 2024-06-09

## 2024-06-09 LAB
ALBUMIN SERPL-MCNC: 4 GM/DL (ref 3.4–5)
ALP BLD-CCNC: 64 IU/L (ref 40–128)
ALT SERPL-CCNC: 23 U/L (ref 10–40)
ANION GAP SERPL CALCULATED.3IONS-SCNC: 11 MMOL/L (ref 7–16)
AST SERPL-CCNC: 18 IU/L (ref 15–37)
BASOPHILS ABSOLUTE: 0 K/CU MM
BASOPHILS RELATIVE PERCENT: 0.4 % (ref 0–1)
BILIRUB SERPL-MCNC: 0.6 MG/DL (ref 0–1)
BUN SERPL-MCNC: 13 MG/DL (ref 6–23)
CALCIUM SERPL-MCNC: 9 MG/DL (ref 8.3–10.6)
CHLORIDE BLD-SCNC: 98 MMOL/L (ref 99–110)
CO2: 26 MMOL/L (ref 21–32)
CREAT SERPL-MCNC: 0.9 MG/DL (ref 0.9–1.3)
DIFFERENTIAL TYPE: ABNORMAL
EOSINOPHILS ABSOLUTE: 0.1 K/CU MM
EOSINOPHILS RELATIVE PERCENT: 1.5 % (ref 0–3)
GFR, ESTIMATED: >90 ML/MIN/1.73M2
GLUCOSE BLD-MCNC: 166 MG/DL (ref 70–99)
GLUCOSE BLD-MCNC: 194 MG/DL (ref 70–99)
GLUCOSE BLD-MCNC: 194 MG/DL (ref 70–99)
GLUCOSE BLD-MCNC: 249 MG/DL (ref 70–99)
GLUCOSE BLD-MCNC: 267 MG/DL (ref 70–99)
GLUCOSE BLD-MCNC: 275 MG/DL (ref 70–99)
GLUCOSE BLD-MCNC: 63 MG/DL (ref 70–99)
GLUCOSE SERPL-MCNC: 275 MG/DL (ref 70–99)
HCT VFR BLD CALC: 39.8 % (ref 42–52)
HEMOGLOBIN: 13.4 GM/DL (ref 13.5–18)
IMMATURE NEUTROPHIL %: 0.2 % (ref 0–0.43)
LYMPHOCYTES ABSOLUTE: 1.7 K/CU MM
LYMPHOCYTES RELATIVE PERCENT: 32.6 % (ref 24–44)
MCH RBC QN AUTO: 28.3 PG (ref 27–31)
MCHC RBC AUTO-ENTMCNC: 33.7 % (ref 32–36)
MCV RBC AUTO: 84 FL (ref 78–100)
MONOCYTES ABSOLUTE: 0.8 K/CU MM
MONOCYTES RELATIVE PERCENT: 14.9 % (ref 0–4)
NEUTROPHILS ABSOLUTE: 2.6 K/CU MM
NEUTROPHILS RELATIVE PERCENT: 50.4 % (ref 36–66)
NUCLEATED RBC %: 0 %
PDW BLD-RTO: 14.3 % (ref 11.7–14.9)
PLATELET # BLD: 291 K/CU MM (ref 140–440)
PMV BLD AUTO: 10.1 FL (ref 7.5–11.1)
POTASSIUM SERPL-SCNC: 3.6 MMOL/L (ref 3.5–5.1)
RBC # BLD: 4.74 M/CU MM (ref 4.6–6.2)
SODIUM BLD-SCNC: 135 MMOL/L (ref 135–145)
TOTAL IMMATURE NEUTOROPHIL: 0.01 K/CU MM
TOTAL NUCLEATED RBC: 0 K/CU MM
TOTAL PROTEIN: 6.9 GM/DL (ref 6.4–8.2)
WBC # BLD: 5.2 K/CU MM (ref 4–10.5)

## 2024-06-09 PROCEDURE — G0378 HOSPITAL OBSERVATION PER HR: HCPCS

## 2024-06-09 PROCEDURE — 85025 COMPLETE CBC W/AUTO DIFF WBC: CPT

## 2024-06-09 PROCEDURE — 82962 GLUCOSE BLOOD TEST: CPT

## 2024-06-09 PROCEDURE — 36415 COLL VENOUS BLD VENIPUNCTURE: CPT

## 2024-06-09 PROCEDURE — 83036 HEMOGLOBIN GLYCOSYLATED A1C: CPT

## 2024-06-09 PROCEDURE — 6370000000 HC RX 637 (ALT 250 FOR IP): Performed by: STUDENT IN AN ORGANIZED HEALTH CARE EDUCATION/TRAINING PROGRAM

## 2024-06-09 PROCEDURE — 1200000000 HC SEMI PRIVATE

## 2024-06-09 PROCEDURE — 94761 N-INVAS EAR/PLS OXIMETRY MLT: CPT

## 2024-06-09 PROCEDURE — 80053 COMPREHEN METABOLIC PANEL: CPT

## 2024-06-09 PROCEDURE — 6360000002 HC RX W HCPCS: Performed by: STUDENT IN AN ORGANIZED HEALTH CARE EDUCATION/TRAINING PROGRAM

## 2024-06-09 PROCEDURE — 96372 THER/PROPH/DIAG INJ SC/IM: CPT

## 2024-06-09 RX ORDER — ACETAMINOPHEN 650 MG/1
650 SUPPOSITORY RECTAL EVERY 6 HOURS PRN
Status: DISCONTINUED | OUTPATIENT
Start: 2024-06-09 | End: 2024-06-10 | Stop reason: HOSPADM

## 2024-06-09 RX ORDER — INSULIN LISPRO 100 [IU]/ML
0-4 INJECTION, SOLUTION INTRAVENOUS; SUBCUTANEOUS NIGHTLY
Status: DISCONTINUED | OUTPATIENT
Start: 2024-06-09 | End: 2024-06-10

## 2024-06-09 RX ORDER — ONDANSETRON 4 MG/1
4 TABLET, ORALLY DISINTEGRATING ORAL EVERY 8 HOURS PRN
Status: DISCONTINUED | OUTPATIENT
Start: 2024-06-09 | End: 2024-06-10 | Stop reason: HOSPADM

## 2024-06-09 RX ORDER — INSULIN GLARGINE 100 [IU]/ML
12 INJECTION, SOLUTION SUBCUTANEOUS NIGHTLY
Status: DISCONTINUED | OUTPATIENT
Start: 2024-06-09 | End: 2024-06-09

## 2024-06-09 RX ORDER — INSULIN GLARGINE 100 [IU]/ML
20 INJECTION, SOLUTION SUBCUTANEOUS NIGHTLY
Status: DISCONTINUED | OUTPATIENT
Start: 2024-06-09 | End: 2024-06-10

## 2024-06-09 RX ORDER — INSULIN GLARGINE 100 [IU]/ML
20 INJECTION, SOLUTION SUBCUTANEOUS NIGHTLY
Status: DISCONTINUED | OUTPATIENT
Start: 2024-06-09 | End: 2024-06-09

## 2024-06-09 RX ORDER — GLUCAGON 1 MG/ML
1 KIT INJECTION PRN
Status: DISCONTINUED | OUTPATIENT
Start: 2024-06-09 | End: 2024-06-10 | Stop reason: HOSPADM

## 2024-06-09 RX ORDER — ENOXAPARIN SODIUM 100 MG/ML
40 INJECTION SUBCUTANEOUS DAILY
Status: DISCONTINUED | OUTPATIENT
Start: 2024-06-09 | End: 2024-06-10 | Stop reason: HOSPADM

## 2024-06-09 RX ORDER — INSULIN LISPRO 100 [IU]/ML
0-4 INJECTION, SOLUTION INTRAVENOUS; SUBCUTANEOUS
Status: DISCONTINUED | OUTPATIENT
Start: 2024-06-09 | End: 2024-06-10

## 2024-06-09 RX ORDER — ONDANSETRON 2 MG/ML
4 INJECTION INTRAMUSCULAR; INTRAVENOUS EVERY 6 HOURS PRN
Status: DISCONTINUED | OUTPATIENT
Start: 2024-06-09 | End: 2024-06-10 | Stop reason: HOSPADM

## 2024-06-09 RX ORDER — INSULIN LISPRO 100 [IU]/ML
7 INJECTION, SOLUTION INTRAVENOUS; SUBCUTANEOUS
Status: DISCONTINUED | OUTPATIENT
Start: 2024-06-09 | End: 2024-06-10

## 2024-06-09 RX ORDER — INSULIN LISPRO 100 [IU]/ML
4 INJECTION, SOLUTION INTRAVENOUS; SUBCUTANEOUS
Status: DISCONTINUED | OUTPATIENT
Start: 2024-06-09 | End: 2024-06-09

## 2024-06-09 RX ORDER — ACETAMINOPHEN 325 MG/1
650 TABLET ORAL EVERY 6 HOURS PRN
Status: DISCONTINUED | OUTPATIENT
Start: 2024-06-09 | End: 2024-06-10 | Stop reason: HOSPADM

## 2024-06-09 RX ORDER — INSULIN LISPRO 100 [IU]/ML
6 INJECTION, SOLUTION INTRAVENOUS; SUBCUTANEOUS
Status: DISCONTINUED | OUTPATIENT
Start: 2024-06-09 | End: 2024-06-09

## 2024-06-09 RX ORDER — DEXTROSE MONOHYDRATE 100 MG/ML
INJECTION, SOLUTION INTRAVENOUS CONTINUOUS PRN
Status: DISCONTINUED | OUTPATIENT
Start: 2024-06-09 | End: 2024-06-10 | Stop reason: HOSPADM

## 2024-06-09 RX ADMIN — INSULIN GLARGINE 20 UNITS: 100 INJECTION, SOLUTION SUBCUTANEOUS at 20:48

## 2024-06-09 RX ADMIN — INSULIN LISPRO 4 UNITS: 100 INJECTION, SOLUTION INTRAVENOUS; SUBCUTANEOUS at 09:07

## 2024-06-09 RX ADMIN — INSULIN LISPRO 4 UNITS: 100 INJECTION, SOLUTION INTRAVENOUS; SUBCUTANEOUS at 11:16

## 2024-06-09 RX ADMIN — INSULIN LISPRO 7 UNITS: 100 INJECTION, SOLUTION INTRAVENOUS; SUBCUTANEOUS at 11:16

## 2024-06-09 RX ADMIN — ENOXAPARIN SODIUM 40 MG: 100 INJECTION SUBCUTANEOUS at 11:17

## 2024-06-09 NOTE — H&P
Food in the Last Year: Never true     Ran Out of Food in the Last Year: Never true   Transportation Needs: No Transportation Needs (6/9/2024)    PRAPARE - Transportation     Lack of Transportation (Medical): No     Lack of Transportation (Non-Medical): No   Housing Stability: High Risk (6/9/2024)    Housing Stability Vital Sign     Unable to Pay for Housing in the Last Year: Yes     Number of Places Lived in the Last Year: 1     Unstable Housing in the Last Year: No       Medications:     Current Facility-Administered Medications   Medication Dose Route Frequency Provider Last Rate Last Admin    enoxaparin (LOVENOX) injection 40 mg  40 mg SubCUTAneous Daily Sebastián Sunshine MD        ondansetron (ZOFRAN-ODT) disintegrating tablet 4 mg  4 mg Oral Q8H PRN Sebastián Sunshine MD        Or    ondansetron (ZOFRAN) injection 4 mg  4 mg IntraVENous Q6H PRN Sebastián Sunshine MD        acetaminophen (TYLENOL) tablet 650 mg  650 mg Oral Q6H PRN Sebastián Sunshine MD        Or    acetaminophen (TYLENOL) suppository 650 mg  650 mg Rectal Q6H PRN Sebastián Sunshine MD        glucose chewable tablet 16 g  4 tablet Oral PRN Sebastián Sunshine MD        dextrose bolus 10% 125 mL  125 mL IntraVENous PRN Sebastián Sunshine MD        Or    dextrose bolus 10% 250 mL  250 mL IntraVENous PRN Sebastián Sunshine MD        glucagon injection 1 mg  1 mg SubCUTAneous PRN Sebastián Sunshine MD        dextrose 10 % infusion   IntraVENous Continuous PRSebastián Malhotra MD        insulin lispro (HUMALOG,ADMELOG) injection vial 0-4 Units  0-4 Units SubCUTAneous TID Sebastián Garcia MD        insulin lispro (HUMALOG,ADMELOG) injection vial 0-4 Units  0-4 Units SubCUTAneous Nightly Sebastián Sunshine MD        insulin glargine (LANTUS) injection vial 12 Units  12 Units SubCUTAneous Nightly Sebastián Sunshine MD        insulin lispro (HUMALOG,ADMELOG) injection vial 4 Units  4 Units SubCUTAneous TID Sebastián Garcia MD         Labs      Labs

## 2024-06-09 NOTE — PROGRESS NOTES
Admitted by my colleague earlier this morning.  Patient seen and examined.    Communicated via .  Denies any complaints.  Reports he was unable to get into primary care since last admission and had adequate supply from that discharge.  General appearance: alert and cooperative with exam  Lungs: Not in respiratory distress.  Able to speak full sentences.  Lungs clear to auscultation.  Heart: regular rate and rhythm, S1, S2 normal, no murmur, click, rub or gallop  Abdomen: soft, non-tender; bowel sounds normal; no masses,  no organomegaly  Extremities: extremities normal, atraumatic, no color change, cyanosis or edema  Neurologic: Alert and oriented to time place and person.  Strength 5 out of 5 in bilateral upper and lower extremities.  Sensation to light touch is intact and equal in bilateral upper and lower extremities.    Not in DKA.  Hypoglycemia controlled currently with basal bolus regimen and will titrate over the next 24 hours and likely discharge tomorrow morning.  Needs set up with primary care.

## 2024-06-09 NOTE — CONSULTS
Session ID: [id number]  Language: Citizen of the [place] Cretao   ID: #[id number]   Name: Leslie

## 2024-06-09 NOTE — PLAN OF CARE
Problem: Discharge Planning  Goal: Discharge to home or other facility with appropriate resources  Outcome: Progressing     Problem: Safety - Adult  Goal: Free from fall injury  Outcome: Progressing     Problem: Metabolic/Fluid and Electrolytes - Adult  Goal: Electrolytes maintained within normal limits  Outcome: Progressing  Goal: Hemodynamic stability and optimal renal function maintained  Outcome: Progressing  Goal: Glucose maintained within prescribed range  Outcome: Progressing     Problem: Chronic Conditions and Co-morbidities  Goal: Patient's chronic conditions and co-morbidity symptoms are monitored and maintained or improved  Outcome: Progressing

## 2024-06-09 NOTE — PROGRESS NOTES
4 Eyes Skin Assessment     NAME:  Adrianna Landin  YOB: 1985  MEDICAL RECORD NUMBER:  [id number]    The patient is being assessed for  Admission    I agree that at least one RN has performed a thorough Head to Toe Skin Assessment on the patient. ALL assessment sites listed below have been assessed.      Areas assessed by both nurses:    Head, Face, Ears, Shoulders, Back, Chest, Arms, Elbows, Hands, Sacrum. Buttock, Coccyx, Ischium, and Legs. Feet and Heels        Does the Patient have a Wound? No noted wound(s)       Jesus Prevention initiated by RN: No  Wound Care Orders initiated by RN: No    Pressure Injury (Stage 3,4, Unstageable, DTI, NWPT, and Complex wounds) if present, place Wound referral order by RN under : No    New Ostomies, if present place, Ostomy referral order under : No     Nurse 1 eSignature: Electronically signed by Jennifer Oliveira RN on 6/9/24 at 3:22 AM EDT    **SHARE this note so that the co-signing nurse can place an eSignature**    Nurse 2 eSignature: Electronically signed by Shayla Burnett RN on 6/9/24 at 7:07 AM EDT

## 2024-06-10 VITALS
SYSTOLIC BLOOD PRESSURE: 103 MMHG | RESPIRATION RATE: 17 BRPM | BODY MASS INDEX: 20.51 KG/M2 | TEMPERATURE: 98.3 F | DIASTOLIC BLOOD PRESSURE: 82 MMHG | OXYGEN SATURATION: 97 % | HEART RATE: 79 BPM | HEIGHT: 69 IN | WEIGHT: 138.45 LBS

## 2024-06-10 LAB
ANION GAP SERPL CALCULATED.3IONS-SCNC: 11 MMOL/L (ref 7–16)
BASOPHILS ABSOLUTE: 0 K/CU MM
BASOPHILS RELATIVE PERCENT: 0.4 % (ref 0–1)
BUN SERPL-MCNC: 12 MG/DL (ref 6–23)
CALCIUM SERPL-MCNC: 8.6 MG/DL (ref 8.3–10.6)
CHLORIDE BLD-SCNC: 100 MMOL/L (ref 99–110)
CO2: 25 MMOL/L (ref 21–32)
CREAT SERPL-MCNC: 0.7 MG/DL (ref 0.9–1.3)
DIFFERENTIAL TYPE: ABNORMAL
EOSINOPHILS ABSOLUTE: 0.1 K/CU MM
EOSINOPHILS RELATIVE PERCENT: 1.3 % (ref 0–3)
GFR, ESTIMATED: >90 ML/MIN/1.73M2
GLUCOSE BLD-MCNC: 196 MG/DL (ref 70–99)
GLUCOSE BLD-MCNC: 325 MG/DL (ref 70–99)
GLUCOSE SERPL-MCNC: 306 MG/DL (ref 70–99)
HCT VFR BLD CALC: 40.4 % (ref 42–52)
HEMOGLOBIN: 13.2 GM/DL (ref 13.5–18)
IMMATURE NEUTROPHIL %: 0.2 % (ref 0–0.43)
LYMPHOCYTES ABSOLUTE: 2 K/CU MM
LYMPHOCYTES RELATIVE PERCENT: 37.2 % (ref 24–44)
MCH RBC QN AUTO: 27.7 PG (ref 27–31)
MCHC RBC AUTO-ENTMCNC: 32.7 % (ref 32–36)
MCV RBC AUTO: 84.9 FL (ref 78–100)
MONOCYTES ABSOLUTE: 0.6 K/CU MM
MONOCYTES RELATIVE PERCENT: 11.1 % (ref 0–4)
NEUTROPHILS ABSOLUTE: 2.6 K/CU MM
NEUTROPHILS RELATIVE PERCENT: 49.8 % (ref 36–66)
NUCLEATED RBC %: 0 %
PDW BLD-RTO: 14.6 % (ref 11.7–14.9)
PLATELET # BLD: 268 K/CU MM (ref 140–440)
PMV BLD AUTO: 10 FL (ref 7.5–11.1)
POTASSIUM SERPL-SCNC: 3.7 MMOL/L (ref 3.5–5.1)
RBC # BLD: 4.76 M/CU MM (ref 4.6–6.2)
SODIUM BLD-SCNC: 136 MMOL/L (ref 135–145)
TOTAL IMMATURE NEUTOROPHIL: 0.01 K/CU MM
TOTAL NUCLEATED RBC: 0 K/CU MM
WBC # BLD: 5.3 K/CU MM (ref 4–10.5)

## 2024-06-10 PROCEDURE — G0378 HOSPITAL OBSERVATION PER HR: HCPCS

## 2024-06-10 PROCEDURE — 6360000002 HC RX W HCPCS: Performed by: STUDENT IN AN ORGANIZED HEALTH CARE EDUCATION/TRAINING PROGRAM

## 2024-06-10 PROCEDURE — 83036 HEMOGLOBIN GLYCOSYLATED A1C: CPT

## 2024-06-10 PROCEDURE — 82962 GLUCOSE BLOOD TEST: CPT

## 2024-06-10 PROCEDURE — 85025 COMPLETE CBC W/AUTO DIFF WBC: CPT

## 2024-06-10 PROCEDURE — 6370000000 HC RX 637 (ALT 250 FOR IP): Performed by: STUDENT IN AN ORGANIZED HEALTH CARE EDUCATION/TRAINING PROGRAM

## 2024-06-10 PROCEDURE — 96372 THER/PROPH/DIAG INJ SC/IM: CPT

## 2024-06-10 PROCEDURE — 6370000000 HC RX 637 (ALT 250 FOR IP): Performed by: INTERNAL MEDICINE

## 2024-06-10 PROCEDURE — 36415 COLL VENOUS BLD VENIPUNCTURE: CPT

## 2024-06-10 PROCEDURE — 80048 BASIC METABOLIC PNL TOTAL CA: CPT

## 2024-06-10 RX ORDER — PEN NEEDLE, DIABETIC 31 GX5/16"
1 NEEDLE, DISPOSABLE MISCELLANEOUS 4 TIMES DAILY
Qty: 100 EACH | Refills: 5 | Status: SHIPPED | OUTPATIENT
Start: 2024-06-10

## 2024-06-10 RX ORDER — INSULIN GLARGINE 100 [IU]/ML
35 INJECTION, SOLUTION SUBCUTANEOUS NIGHTLY
Qty: 10 ML | Refills: 3 | Status: SHIPPED | OUTPATIENT
Start: 2024-06-10

## 2024-06-10 RX ORDER — INSULIN LISPRO 100 [IU]/ML
0-4 INJECTION, SOLUTION INTRAVENOUS; SUBCUTANEOUS
Status: DISCONTINUED | OUTPATIENT
Start: 2024-06-10 | End: 2024-06-10 | Stop reason: HOSPADM

## 2024-06-10 RX ORDER — INSULIN LISPRO 100 [IU]/ML
15 INJECTION, SOLUTION INTRAVENOUS; SUBCUTANEOUS
Status: DISCONTINUED | OUTPATIENT
Start: 2024-06-10 | End: 2024-06-10 | Stop reason: HOSPADM

## 2024-06-10 RX ORDER — INSULIN LISPRO 100 [IU]/ML
0-8 INJECTION, SOLUTION INTRAVENOUS; SUBCUTANEOUS
Status: DISCONTINUED | OUTPATIENT
Start: 2024-06-10 | End: 2024-06-10 | Stop reason: HOSPADM

## 2024-06-10 RX ORDER — METFORMIN HYDROCHLORIDE 500 MG/1
500 TABLET, EXTENDED RELEASE ORAL 2 TIMES DAILY
Qty: 60 TABLET | Refills: 5 | Status: SHIPPED | OUTPATIENT
Start: 2024-06-10 | End: 2024-07-10

## 2024-06-10 RX ORDER — INSULIN GLARGINE 100 [IU]/ML
35 INJECTION, SOLUTION SUBCUTANEOUS NIGHTLY
Status: DISCONTINUED | OUTPATIENT
Start: 2024-06-10 | End: 2024-06-10 | Stop reason: HOSPADM

## 2024-06-10 RX ORDER — INSULIN LISPRO 100 [IU]/ML
10 INJECTION, SOLUTION INTRAVENOUS; SUBCUTANEOUS
Qty: 9 ML | Refills: 0 | Status: SHIPPED | OUTPATIENT
Start: 2024-06-10 | End: 2024-07-10

## 2024-06-10 RX ADMIN — ENOXAPARIN SODIUM 40 MG: 100 INJECTION SUBCUTANEOUS at 07:54

## 2024-06-10 RX ADMIN — INSULIN LISPRO 4 UNITS: 100 INJECTION, SOLUTION INTRAVENOUS; SUBCUTANEOUS at 07:16

## 2024-06-10 RX ADMIN — INSULIN HUMAN 10 UNITS: 100 INJECTION, SUSPENSION SUBCUTANEOUS at 09:49

## 2024-06-10 RX ADMIN — INSULIN LISPRO 7 UNITS: 100 INJECTION, SOLUTION INTRAVENOUS; SUBCUTANEOUS at 07:16

## 2024-06-10 NOTE — CONSULTS
Endocrinology   Consult Note  6/9/2024 12:53 AM     Primary Care provider: No primary care provider on file.     Referring physician:  Alexy Vilchis MD     Dear Doctor    Thank You for the Consult     Pt. Was Admitted for :    Reason for Consult:  ***      History Obtained From:  Patient/ EMR       HISTORY OF PRESENT ILLNESS:                The patient is a 39 y.o. male with significant past medical history of *** I was  consulted for better control of blood glucose.       ROS:   Pt's ROS done in detail.  Abnormal ROS are noted in Medical and Surgical History Section below:     Other Medical History:    No past medical history on file.  Surgical History:    No past surgical history on file.    Allergies:  Patient has no known allergies.    Family History:   No family history on file.  REVIEW OF SYSTEMS:  Review of System Done as noted above     PHYSICAL EXAM:      Vitals:    /82   Pulse 79   Temp 98.3 °F (36.8 °C) (Oral)   Resp 17   Ht 1.753 m (5' 9\")   Wt 62.8 kg (138 lb 7.2 oz)   SpO2 97%   BMI 20.45 kg/m²     CONSTITUTIONAL:  awake, alert, cooperative, appears stated age   EYES:  vision intact Fundoscopic Exam not performed   ENT:Normal  NECK:  Supple, No JVD.   Thyroid Exam:Normal   LUNGS:  Has Vesicular Breath Sounds,   CARDIOVASCULAR:  Normal apical impulse, regular rate and rhythm, normal S1 and S2, no S3 or S4, and has no  murmur   ABDOMEN:  No scars, normal bowel sounds, soft, non-distended, non-tender, no masses palpated, no hepatolienomegaly  Musculoskeletal: Normal  Extremities: Normal, peripheral pulses normal, , has no edema   NEUROLOGIC:  Awake, alert, oriented to name, place and time.  Cranial nerves II-XII are grossly intact.  Motor is  intact.  Sensory is intact. ,  and gait is normal.    DATA:    CBC:   Recent Labs     06/09/24  0906 06/10/24  0731   WBC 5.2 5.3   HGB 13.4* 13.2*    268    CMP:  Recent Labs     06/09/24  0906 06/10/24  0731    136   K 3.6 3.7   CL 98*

## 2024-06-10 NOTE — DISCHARGE SUMMARY
V2.0  Discharge Summary    Name:  Adrianna Landin /Age/Sex: 1985 (39 y.o. male)   Admit Date: 2024  Discharge Date: 6/10/24    MRN & CSN:  [id number] & [id number] Encounter Date and Time 6/10/24 12:22 PM EDT    Attending:  Johnson Pratt MD Discharging Provider: CHEY Sharp - CNP       Hospital Course:     Brief HPI: Adrianna Landin is a 39 y.o. male with a history of for type 2 diabetes who presented with hyperglycemia.  Patient was transferred from [facility] ED with insulin drip.  Patient was found glucose level 800.  Patient stated ran out of his insulin and metformin.  Patient does not have a primary care physician to follow-up with.  His hemoglobin A1c was 15 3 months ago.  Patient received Lantus 20 units and insulin sliding scale plus insulin 7 units with meals yesterday and today.  His glucose improved to 190s.  Consulted endocrinology.  Dr. Cutler adjusted his Lantus to 35 units nightly.  Patient requested to go home.  Prescription of Lantus, insulin, metformin including insulin pen needle sent to pharmacy.  Patient stated he has glucose meter and strips at home.  Patient is referred to endocrinology outpatient follow-up.   is used for the above instruction.  Patient understood and agreed the above plan.    Brief Problem Based Course:   T2DM w/ hyperglycemia, long term use of insulin  Secondary to medication non adherence due to lack of access to primary health care  HbA1c 15% 3 months ago  Appreciate endocrinology input: Increase Lantus to 35 units nightly, insulin 15 units with sliding scale  -Patient glucose improved to 190s this morning.  Patient requested to go home.  -Prescription of insulin, metformin sent to pharmacy.  Patient will follow-up with endocrinology outpatient     Observation, stepdown  Full code      The patient expressed appropriate understanding of, and agreement with the discharge recommendations, medications, and plan.     Consults this

## 2024-06-10 NOTE — PROGRESS NOTES
Outpatient Pharmacy Progress Note for Meds-to-Beds    Total number of Prescriptions Filled: 4    Additional Documentation:  Medications given to nurse   to provide to patient      Thank you for letting us serve your patients.  [facility] - [address] [address]    Phone: [phone]    Fax: [phone]

## 2024-06-10 NOTE — PROGRESS NOTES
4 Eyes Skin Assessment     NAME:  Adrianna Landin  YOB: 1985  MEDICAL RECORD NUMBER:  [id number]    The patient is being assessed for  Admission    I agree that at least one RN has performed a thorough Head to Toe Skin Assessment on the patient. ALL assessment sites listed below have been assessed.      Areas assessed by both nurses:    Head, Face, Ears, Shoulders, Back, Chest, Arms, Elbows, Hands, Sacrum. Buttock, Coccyx, Ischium, and Legs. Feet and Heels        Does the Patient have a Wound? No noted wound(s)       Jesus Prevention initiated by RN: No  Wound Care Orders initiated by RN: No    Pressure Injury (Stage 3,4, Unstageable, DTI, NWPT, and Complex wounds) if present, place Wound referral order by RN under : No    New Ostomies, if present place, Ostomy referral order under : No     Nurse 1 eSignature: Electronically signed by Slim Duron RN on 6/10/24 at 7:03 AM EDT    **SHARE this note so that the co-signing nurse can place an eSignature**    Nurse 2 eSignature: Electronically signed by Neeru Dykes RN on 6/10/24 at 7:04 AM EDT

## 2024-06-10 NOTE — PLAN OF CARE
Problem: Discharge Planning  Goal: Discharge to home or other facility with appropriate resources  6/9/2024 2221 by Jennifer Oliveira RN  Outcome: Progressing  6/9/2024 1151 by Tierra Moseley RN  Outcome: Progressing     Problem: Safety - Adult  Goal: Free from fall injury  6/9/2024 2221 by Jennifer Oliveira RN  Outcome: Progressing  6/9/2024 1151 by Tierra Moseley RN  Outcome: Progressing     Problem: Metabolic/Fluid and Electrolytes - Adult  Goal: Electrolytes maintained within normal limits  6/9/2024 2221 by Jennifer Oliveira RN  Outcome: Progressing  6/9/2024 1151 by Tierra Moseley RN  Outcome: Progressing  Goal: Hemodynamic stability and optimal renal function maintained  6/9/2024 2221 by Jennifer Oliveira RN  Outcome: Progressing  6/9/2024 1151 by Tierra Moseley RN  Outcome: Progressing  Goal: Glucose maintained within prescribed range  6/9/2024 2221 by Jennifer Oliveira RN  Outcome: Progressing  6/9/2024 1151 by Tierra Moseley RN  Outcome: Progressing     Problem: Chronic Conditions and Co-morbidities  Goal: Patient's chronic conditions and co-morbidity symptoms are monitored and maintained or improved  6/9/2024 2221 by Jennifer Oliveira RN  Outcome: Progressing  6/9/2024 1151 by Tierra Moseley RN  Outcome: Progressing

## 2024-06-10 NOTE — PROGRESS NOTES
Pt transferred to this unit inpatient. POC discharge tomorrow. Rec'd report from Yany BEE. PT VSS A&O X4. Pt speaks Malagasy/Creole. Remote interpretor at bedside. Skin assessment complete. Pt is steady during ambulation. Care ongoing

## 2024-06-10 NOTE — DISCHARGE INSTRUCTIONS
Please take your insulin as instruction, follow-up with endocrinology in 1 to 2 weeks, return to ER for worsening symptoms or any other concerns.

## 2024-06-17 LAB
ESTIMATED AVERAGE GLUCOSE: >427 MG/DL
ESTIMATED AVERAGE GLUCOSE: >427 MG/DL
HBA1C MFR BLD: >16.5 % (ref 4.2–6.3)
HBA1C MFR BLD: >16.5 % (ref 4.2–6.3)

## 2024-11-21 ENCOUNTER — HOSPITAL ENCOUNTER (INPATIENT)
Age: 39
LOS: 3 days | Discharge: HOME | DRG: 638 | End: 2024-11-24
Attending: INTERNAL MEDICINE | Admitting: INTERNAL MEDICINE

## 2024-11-21 DIAGNOSIS — E10.10 DKA, TYPE 1, NOT AT GOAL (HCC): Primary | ICD-10-CM

## 2024-11-21 DIAGNOSIS — E11.10 DKA, TYPE 2, NOT AT GOAL (HCC): ICD-10-CM

## 2024-11-21 LAB — GLUCOSE BLD-MCNC: 214 MG/DL (ref 74–99)

## 2024-11-21 PROCEDURE — 82947 ASSAY GLUCOSE BLOOD QUANT: CPT

## 2024-11-21 PROCEDURE — 2580000003 HC RX 258: Performed by: INTERNAL MEDICINE

## 2024-11-21 PROCEDURE — 2000000000 HC ICU R&B

## 2024-11-21 RX ORDER — ENOXAPARIN SODIUM 100 MG/ML
40 INJECTION SUBCUTANEOUS DAILY
Status: DISCONTINUED | OUTPATIENT
Start: 2024-11-22 | End: 2024-11-24 | Stop reason: HOSPADM

## 2024-11-21 RX ORDER — DEXTROSE MONOHYDRATE AND SODIUM CHLORIDE 5; .45 G/100ML; G/100ML
INJECTION, SOLUTION INTRAVENOUS CONTINUOUS PRN
Status: DISCONTINUED | OUTPATIENT
Start: 2024-11-21 | End: 2024-11-24 | Stop reason: HOSPADM

## 2024-11-21 RX ORDER — MAGNESIUM SULFATE IN WATER 40 MG/ML
2000 INJECTION, SOLUTION INTRAVENOUS PRN
Status: DISCONTINUED | OUTPATIENT
Start: 2024-11-21 | End: 2024-11-24 | Stop reason: HOSPADM

## 2024-11-21 RX ORDER — POTASSIUM CHLORIDE 7.45 MG/ML
10 INJECTION INTRAVENOUS PRN
Status: DISCONTINUED | OUTPATIENT
Start: 2024-11-21 | End: 2024-11-24 | Stop reason: HOSPADM

## 2024-11-21 RX ORDER — SODIUM CHLORIDE 9 MG/ML
INJECTION, SOLUTION INTRAVENOUS CONTINUOUS
Status: DISCONTINUED | OUTPATIENT
Start: 2024-11-21 | End: 2024-11-24 | Stop reason: HOSPADM

## 2024-11-21 RX ORDER — POLYETHYLENE GLYCOL 3350 17 G/17G
17 POWDER, FOR SOLUTION ORAL DAILY PRN
Status: DISCONTINUED | OUTPATIENT
Start: 2024-11-21 | End: 2024-11-24 | Stop reason: HOSPADM

## 2024-11-21 RX ADMIN — DEXTROSE AND SODIUM CHLORIDE: 5; 450 INJECTION, SOLUTION INTRAVENOUS at 23:05

## 2024-11-22 ENCOUNTER — APPOINTMENT (OUTPATIENT)
Dept: GENERAL RADIOLOGY | Age: 39
DRG: 638 | End: 2024-11-22
Attending: STUDENT IN AN ORGANIZED HEALTH CARE EDUCATION/TRAINING PROGRAM

## 2024-11-22 LAB
ANION GAP SERPL CALCULATED.3IONS-SCNC: 17 MMOL/L (ref 9–17)
ANION GAP SERPL CALCULATED.3IONS-SCNC: 21 MMOL/L (ref 9–17)
B-OH-BUTYR SERPL-MCNC: 5.82 MMOL/L (ref 0–0.27)
BUN SERPL-MCNC: 7 MG/DL (ref 7–20)
BUN SERPL-MCNC: 8 MG/DL (ref 7–20)
CALCIUM SERPL-MCNC: 8.5 MG/DL (ref 8.3–10.6)
CALCIUM SERPL-MCNC: 8.6 MG/DL (ref 8.3–10.6)
CHLORIDE SERPL-SCNC: 104 MMOL/L (ref 99–110)
CHLORIDE SERPL-SCNC: 107 MMOL/L (ref 99–110)
CO2 SERPL-SCNC: 12 MMOL/L (ref 21–32)
CO2 SERPL-SCNC: 9 MMOL/L (ref 21–32)
CREAT SERPL-MCNC: 0.9 MG/DL (ref 0.9–1.3)
CREAT SERPL-MCNC: 1.1 MG/DL (ref 0.9–1.3)
EST. AVERAGE GLUCOSE BLD GHB EST-MCNC: 453 MG/DL
GFR, ESTIMATED: 75 ML/MIN/1.73M2
GFR, ESTIMATED: >90 ML/MIN/1.73M2
GLUCOSE BLD-MCNC: 107 MG/DL (ref 74–99)
GLUCOSE BLD-MCNC: 131 MG/DL (ref 74–99)
GLUCOSE BLD-MCNC: 138 MG/DL (ref 74–99)
GLUCOSE BLD-MCNC: 154 MG/DL (ref 74–99)
GLUCOSE BLD-MCNC: 154 MG/DL (ref 74–99)
GLUCOSE BLD-MCNC: 156 MG/DL (ref 74–99)
GLUCOSE BLD-MCNC: 160 MG/DL (ref 74–99)
GLUCOSE BLD-MCNC: 160 MG/DL (ref 74–99)
GLUCOSE BLD-MCNC: 161 MG/DL (ref 74–99)
GLUCOSE BLD-MCNC: 187 MG/DL (ref 74–99)
GLUCOSE BLD-MCNC: 205 MG/DL (ref 74–99)
GLUCOSE BLD-MCNC: 52 MG/DL (ref 74–99)
GLUCOSE BLD-MCNC: 95 MG/DL (ref 74–99)
GLUCOSE SERPL-MCNC: 159 MG/DL (ref 74–99)
GLUCOSE SERPL-MCNC: 168 MG/DL (ref 74–99)
HBA1C MFR BLD: 17.4 % (ref 4.2–6.3)
MAGNESIUM SERPL-MCNC: 1.7 MG/DL (ref 1.8–2.4)
MAGNESIUM SERPL-MCNC: 1.8 MG/DL (ref 1.8–2.4)
PHOSPHATE SERPL-MCNC: 1.9 MG/DL (ref 2.5–4.9)
PHOSPHATE SERPL-MCNC: 2.6 MG/DL (ref 2.5–4.9)
POTASSIUM SERPL-SCNC: 2.9 MMOL/L (ref 3.5–5.1)
POTASSIUM SERPL-SCNC: 3.2 MMOL/L (ref 3.5–5.1)
POTASSIUM SERPL-SCNC: 3.5 MMOL/L (ref 3.5–5.1)
SODIUM SERPL-SCNC: 134 MMOL/L (ref 136–145)
SODIUM SERPL-SCNC: 136 MMOL/L (ref 136–145)

## 2024-11-22 PROCEDURE — 2500000003 HC RX 250 WO HCPCS: Performed by: INTERNAL MEDICINE

## 2024-11-22 PROCEDURE — 82010 KETONE BODYS QUAN: CPT

## 2024-11-22 PROCEDURE — 80048 BASIC METABOLIC PNL TOTAL CA: CPT

## 2024-11-22 PROCEDURE — 94761 N-INVAS EAR/PLS OXIMETRY MLT: CPT

## 2024-11-22 PROCEDURE — 6370000000 HC RX 637 (ALT 250 FOR IP): Performed by: INTERNAL MEDICINE

## 2024-11-22 PROCEDURE — 84132 ASSAY OF SERUM POTASSIUM: CPT

## 2024-11-22 PROCEDURE — 83036 HEMOGLOBIN GLYCOSYLATED A1C: CPT

## 2024-11-22 PROCEDURE — 71045 X-RAY EXAM CHEST 1 VIEW: CPT

## 2024-11-22 PROCEDURE — 82947 ASSAY GLUCOSE BLOOD QUANT: CPT

## 2024-11-22 PROCEDURE — 83735 ASSAY OF MAGNESIUM: CPT

## 2024-11-22 PROCEDURE — 6360000002 HC RX W HCPCS: Performed by: INTERNAL MEDICINE

## 2024-11-22 PROCEDURE — 1200000000 HC SEMI PRIVATE

## 2024-11-22 PROCEDURE — 84100 ASSAY OF PHOSPHORUS: CPT

## 2024-11-22 PROCEDURE — 36415 COLL VENOUS BLD VENIPUNCTURE: CPT

## 2024-11-22 PROCEDURE — 2580000003 HC RX 258: Performed by: INTERNAL MEDICINE

## 2024-11-22 RX ORDER — LANCETS 30 GAUGE
1 EACH MISCELLANEOUS DAILY
Qty: 100 EACH | Refills: 5 | Status: SHIPPED | OUTPATIENT
Start: 2024-11-22

## 2024-11-22 RX ORDER — INSULIN LISPRO 100 [IU]/ML
10 INJECTION, SOLUTION INTRAVENOUS; SUBCUTANEOUS
Status: DISCONTINUED | OUTPATIENT
Start: 2024-11-22 | End: 2024-11-24 | Stop reason: HOSPADM

## 2024-11-22 RX ORDER — GLUCAGON 1 MG/ML
1 KIT INJECTION PRN
Status: DISCONTINUED | OUTPATIENT
Start: 2024-11-22 | End: 2024-11-24 | Stop reason: HOSPADM

## 2024-11-22 RX ORDER — INSULIN LISPRO 100 [IU]/ML
0-4 INJECTION, SOLUTION INTRAVENOUS; SUBCUTANEOUS
Qty: 1 ADJUSTABLE DOSE PRE-FILLED PEN SYRINGE | Refills: 1 | Status: SHIPPED | OUTPATIENT
Start: 2024-11-22

## 2024-11-22 RX ORDER — BLOOD-GLUCOSE METER
1 KIT MISCELLANEOUS DAILY
Qty: 1 KIT | Refills: 0 | Status: SHIPPED | OUTPATIENT
Start: 2024-11-22

## 2024-11-22 RX ORDER — GLUCOSAMINE HCL/CHONDROITIN SU 500-400 MG
CAPSULE ORAL
Qty: 120 STRIP | Refills: 1 | Status: SHIPPED | OUTPATIENT
Start: 2024-11-22

## 2024-11-22 RX ORDER — INSULIN GLARGINE 100 [IU]/ML
30 INJECTION, SOLUTION SUBCUTANEOUS NIGHTLY
Qty: 5 ADJUSTABLE DOSE PRE-FILLED PEN SYRINGE | Refills: 3 | Status: SHIPPED | OUTPATIENT
Start: 2024-11-22

## 2024-11-22 RX ORDER — INSULIN LISPRO 100 [IU]/ML
0-4 INJECTION, SOLUTION INTRAVENOUS; SUBCUTANEOUS
Status: DISCONTINUED | OUTPATIENT
Start: 2024-11-22 | End: 2024-11-24 | Stop reason: HOSPADM

## 2024-11-22 RX ORDER — INSULIN GLARGINE 100 [IU]/ML
30 INJECTION, SOLUTION SUBCUTANEOUS NIGHTLY
Status: DISCONTINUED | OUTPATIENT
Start: 2024-11-22 | End: 2024-11-24 | Stop reason: HOSPADM

## 2024-11-22 RX ORDER — DEXTROSE MONOHYDRATE 100 MG/ML
INJECTION, SOLUTION INTRAVENOUS CONTINUOUS PRN
Status: DISCONTINUED | OUTPATIENT
Start: 2024-11-22 | End: 2024-11-24 | Stop reason: HOSPADM

## 2024-11-22 RX ADMIN — POTASSIUM CHLORIDE 10 MEQ: 7.45 INJECTION INTRAVENOUS at 13:18

## 2024-11-22 RX ADMIN — POTASSIUM CHLORIDE 10 MEQ: 7.45 INJECTION INTRAVENOUS at 14:21

## 2024-11-22 RX ADMIN — INSULIN LISPRO 1 UNITS: 100 INJECTION, SOLUTION INTRAVENOUS; SUBCUTANEOUS at 12:46

## 2024-11-22 RX ADMIN — INSULIN LISPRO 10 UNITS: 100 INJECTION, SOLUTION INTRAVENOUS; SUBCUTANEOUS at 08:18

## 2024-11-22 RX ADMIN — INSULIN LISPRO 10 UNITS: 100 INJECTION, SOLUTION INTRAVENOUS; SUBCUTANEOUS at 16:09

## 2024-11-22 RX ADMIN — POTASSIUM CHLORIDE 10 MEQ: 7.45 INJECTION INTRAVENOUS at 10:53

## 2024-11-22 RX ADMIN — SODIUM CHLORIDE: 9 INJECTION, SOLUTION INTRAVENOUS at 08:23

## 2024-11-22 RX ADMIN — INSULIN LISPRO 10 UNITS: 100 INJECTION, SOLUTION INTRAVENOUS; SUBCUTANEOUS at 12:46

## 2024-11-22 RX ADMIN — INSULIN GLARGINE 30 UNITS: 100 INJECTION, SOLUTION SUBCUTANEOUS at 21:52

## 2024-11-22 RX ADMIN — SODIUM PHOSPHATE, MONOBASIC, MONOHYDRATE AND SODIUM PHOSPHATE, DIBASIC, ANHYDROUS 15 MMOL: 142; 276 INJECTION, SOLUTION INTRAVENOUS at 04:25

## 2024-11-22 RX ADMIN — POTASSIUM CHLORIDE 10 MEQ: 7.45 INJECTION INTRAVENOUS at 11:55

## 2024-11-22 RX ADMIN — POTASSIUM CHLORIDE 10 MEQ: 7.45 INJECTION INTRAVENOUS at 15:31

## 2024-11-22 RX ADMIN — INSULIN LISPRO 1 UNITS: 100 INJECTION, SOLUTION INTRAVENOUS; SUBCUTANEOUS at 21:52

## 2024-11-22 RX ADMIN — ENOXAPARIN SODIUM 40 MG: 100 INJECTION SUBCUTANEOUS at 08:18

## 2024-11-22 RX ADMIN — DEXTROSE AND SODIUM CHLORIDE: 5; 450 INJECTION, SOLUTION INTRAVENOUS at 06:27

## 2024-11-22 ASSESSMENT — PAIN SCALES - GENERAL
PAINLEVEL_OUTOF10: 0
PAINLEVEL_OUTOF10: 0

## 2024-11-22 NOTE — CARE COORDINATION
Patient is currently on [facility] do not help list.  He has been helped with the cost of insulin previously and was given our information to schedule an appointment to complete an application should he need any further help.  He has not followed through.  He will need to complete application and provide all required documents to determine eligibility for any more help.

## 2024-11-22 NOTE — CONSULTS
V2.0    [facility]      Name:  Adrianna Landin /Age/Sex: 1985  (39 y.o. male)   MRN & CSN:  [id number] & [id number] Encounter Date/Time: 2024 1:37 PM EST   Location:  -A PCP: No primary care provider on file.     Attending:Graeme Carrillo MD       Hospital Day: 2    Assessment and Recommendations   Adrianna Landin is a 39 y.o. male with pmh of DM who presents with DKA, type 2, not at goal (HCC)      Plan:   DKA:  -Pt on insulin gt protoocol in ICU  -Endo consult onboard  -replete electrolytes as needed  -antiemetics prn  -get routine labs/ r/o infectious/ intra abdomen pathology    2.Hypokalemia:  -replete to keep K >4    3. Hypomagesemia-  -replete mag to keep level >2    Diet ADULT DIET; Regular; 4 carb choices (60 gm/meal)   DVT Prophylaxis [x] Lovenox, []  Heparin, [] SCDs, [] Ambulation,  [] Eliquis, [] Xarelto  [] Coumadin   Code Status Full Code   Disposition From: Home  Expected Disposition:   Estimated Date of Discharge:   Patient requires continued admission due to    Surrogate Decision Maker/ POA       Personally reviewed Lab Studies and Imaging       Subjective:     Chief Complaint: Nausea, headache and abdomen pain    Adrianna Landin is a 39 y.o. male who presents with DKA      Review of Systems:      Pertinent positives and negatives discussed in HPI    Objective:     Intake/Output Summary (Last 24 hours) at 2024 1337  Last data filed at 2024 0931  Gross per 24 hour   Intake 1061.58 ml   Output 2000 ml   Net -938.42 ml      Vitals:   Vitals:    24 1002 24 1100 24 1200 24 1300   BP:  108/75 104/67 111/68   Pulse:  89 90 86   Resp:  13 20 12   Temp:       TempSrc: Oral      SpO2:       Weight:       Height:             Physical Exam:      General: NAD  Eyes: EOMI  ENT: neck supple  Cardiovascular: Regular rate.  Respiratory: Clear to auscultation  Gastrointestinal: Soft, non tender  Genitourinary: no suprapubic tenderness  Musculoskeletal:

## 2024-11-22 NOTE — CONSULTS
Endocrinology   Consult Note  11/21/2024 10:07 PM     Primary Care provider: No primary care provider on file.     Referring physician:  Graeme Carrillo MD     Dear Doctor Lorena    Thank You for the Consult     Pt. Was Admitted for : Hyperglycemia patient with DKA due to not taking medicine    Reason for Consult: Better control of blood glucose      History Obtained From:  Patient/ EMR       HISTORY OF PRESENT ILLNESS:                The patient is a 39 y.o. male with significant past medical history of type 2 diabetes mellitus has not been taking his insulin for the last several days as he does not have money to buy insulin.  Scheduled feels nauseated and occasional vomiting and has been running high blood glucose presumably because he does not measure his blood glucose.  Patient started on IV insulin has been since he was in DKA.  I was  consulted for better control of blood glucose.  Patient did not several months with same issues of hyperglycemia and DKA and noncompliance of taking his medicine      ROS:   Pt's ROS done in detail.  Abnormal ROS are noted in Medical and Surgical History Section below:     Other Medical History:    No past medical history on file.  Surgical History:    No past surgical history on file.    Allergies:  Patient has no known allergies.    Family History:   No family history on file.  REVIEW OF SYSTEMS:  Review of System Done as noted above     PHYSICAL EXAM:      Vitals:    /75   Pulse 90   Temp 98.3 °F (36.8 °C) (Oral)   Resp 12   Ht 1.626 m (5' 4\")   Wt 62.1 kg (136 lb 14.5 oz)   SpO2 100%   BMI 23.50 kg/m²     CONSTITUTIONAL:  awake, alert, cooperative, appears stated age   EYES:  vision intact Fundoscopic Exam not performed   ENT:Normal  NECK:  Supple, No JVD.   Thyroid Exam:Normal   LUNGS:  Has Vesicular Breath Sounds,   CARDIOVASCULAR:  Normal apical impulse, regular rate and rhythm, normal S1 and S2, no S3 or S4, and has no  murmur   ABDOMEN:  No scars, normal bowel

## 2024-11-22 NOTE — CARE COORDINATION
Dr Cutler would like to ensure that if pt is discharged over the weekend that he goes home with some meds. Dr Cutler does not want discharge held til Monday waiting on meds. Dr Cutler spoke with JARETT Sunshine who agreed to order meds. Cm contacted [facility] explained the above. Although Med assist earlier in the day advised that pt was on the do not help list due to no follow through after med assist helped earlier in the year. Cm requested an exception and they will provide up to 100.00 in med assist. Cm contacted the retail  pharmacy and they will deliver what discharge meds they can assist with to pt's room today. Retail pharmacy staff indicates that [facility] has a glucometer that can be purchased for 9.00 and she will advise pt of this when she drops off meds. Resources printed in Faro[place] creole language added to pt discharge instructions.

## 2024-11-22 NOTE — PLAN OF CARE
Problem: Chronic Conditions and Co-morbidities  Goal: Patient's chronic conditions and co-morbidity symptoms are monitored and maintained or improved  11/22/2024 1000 by Digna Vang RN  Outcome: Progressing  11/22/2024 0541 by Shane Aceves RN  Outcome: Progressing     Problem: Discharge Planning  Goal: Discharge to home or other facility with appropriate resources  11/22/2024 1000 by Digna Vang RN  Outcome: Progressing  11/22/2024 0541 by Shane Aceves RN  Outcome: Progressing     Problem: Skin/Tissue Integrity  Goal: Absence of new skin breakdown  Description: 1.  Monitor for areas of redness and/or skin breakdown  2.  Assess vascular access sites hourly  3.  Every 4-6 hours minimum:  Change oxygen saturation probe site  4.  Every 4-6 hours:  If on nasal continuous positive airway pressure, respiratory therapy assess nares and determine need for appliance change or resting period.  11/22/2024 1000 by Digna Vang RN  Outcome: Progressing  11/22/2024 0541 by Shane Aceves RN  Outcome: Progressing

## 2024-11-22 NOTE — H&P
History and Physical 24        NAME: Adrianna Landin  : 1985  MRN: [id number]      Assessment/Plan:  DKA: Due to inability to afford insulin and associated non-compliance. Will place on DKA protocol, consult endocrine and SW.     Code Status: Full         Chief Complaint:  DKA    History of Present Illness:  Translation services were utilized, as the patient speak Ecuadorean Creole. 40yo male with insulin-dependent DM presents with DKA. He is a [place] immigrant and has not taken his home insulin in months and then began feeling nauseated and experienced headache and abd pain. Found to have glucose >350 at OSH ([facility]).     Labs and radiographic imaging reviewed personally by myself and case discussed with ED physician in detail. Agree with ICU admission.     ROS:    Review of Systems   All other systems reviewed and are negative.       Past Medical, Surgical, Social, Family History:   No past medical history on file.  No past surgical history on file.  Social History     Socioeconomic History    Marital status: Single     Spouse name: Not on file    Number of children: Not on file    Years of education: Not on file    Highest education level: Not on file   Occupational History    Not on file   Tobacco Use    Smoking status: Never    Smokeless tobacco: Never   Vaping Use    Vaping status: Never Used   Substance and Sexual Activity    Alcohol use: Not on file    Drug use: Not on file    Sexual activity: Defer     Partners: Female     Birth control/protection: Abstinence   Other Topics Concern    Not on file   Social History Narrative    Not on file     Social Determinants of Health     Financial Resource Strain: Not on file   Food Insecurity: No Food Insecurity (2024)    Hunger Vital Sign     Worried About Running Out of Food in the Last Year: Never true     Ran Out of Food in the Last Year: Never true   Transportation Needs: No Transportation Needs (2024)    PRAPARE - Transportation

## 2024-11-23 LAB
ANION GAP SERPL CALCULATED.3IONS-SCNC: 10 MMOL/L (ref 9–17)
ANION GAP SERPL CALCULATED.3IONS-SCNC: 12 MMOL/L (ref 9–17)
BUN SERPL-MCNC: 7 MG/DL (ref 7–20)
BUN SERPL-MCNC: 7 MG/DL (ref 7–20)
CALCIUM SERPL-MCNC: 8.3 MG/DL (ref 8.3–10.6)
CALCIUM SERPL-MCNC: 8.4 MG/DL (ref 8.3–10.6)
CHLORIDE SERPL-SCNC: 104 MMOL/L (ref 99–110)
CHLORIDE SERPL-SCNC: 106 MMOL/L (ref 99–110)
CO2 SERPL-SCNC: 18 MMOL/L (ref 21–32)
CO2 SERPL-SCNC: 19 MMOL/L (ref 21–32)
CREAT SERPL-MCNC: 0.6 MG/DL (ref 0.9–1.3)
CREAT SERPL-MCNC: 0.7 MG/DL (ref 0.9–1.3)
GFR, ESTIMATED: >90 ML/MIN/1.73M2
GFR, ESTIMATED: >90 ML/MIN/1.73M2
GLUCOSE BLD-MCNC: 129 MG/DL (ref 74–99)
GLUCOSE BLD-MCNC: 172 MG/DL (ref 74–99)
GLUCOSE BLD-MCNC: 180 MG/DL (ref 74–99)
GLUCOSE BLD-MCNC: 97 MG/DL (ref 74–99)
GLUCOSE SERPL-MCNC: 118 MG/DL (ref 74–99)
GLUCOSE SERPL-MCNC: 122 MG/DL (ref 74–99)
MAGNESIUM SERPL-MCNC: 1.7 MG/DL (ref 1.8–2.4)
PHOSPHATE SERPL-MCNC: 1.2 MG/DL (ref 2.5–4.9)
PHOSPHATE SERPL-MCNC: 2.6 MG/DL (ref 2.5–4.9)
POTASSIUM SERPL-SCNC: 2.5 MMOL/L (ref 3.5–5.1)
POTASSIUM SERPL-SCNC: 3 MMOL/L (ref 3.5–5.1)
SODIUM SERPL-SCNC: 134 MMOL/L (ref 136–145)
SODIUM SERPL-SCNC: 135 MMOL/L (ref 136–145)

## 2024-11-23 PROCEDURE — 6360000002 HC RX W HCPCS: Performed by: STUDENT IN AN ORGANIZED HEALTH CARE EDUCATION/TRAINING PROGRAM

## 2024-11-23 PROCEDURE — 6360000002 HC RX W HCPCS: Performed by: INTERNAL MEDICINE

## 2024-11-23 PROCEDURE — 94761 N-INVAS EAR/PLS OXIMETRY MLT: CPT

## 2024-11-23 PROCEDURE — 84100 ASSAY OF PHOSPHORUS: CPT

## 2024-11-23 PROCEDURE — 1200000000 HC SEMI PRIVATE

## 2024-11-23 PROCEDURE — 6370000000 HC RX 637 (ALT 250 FOR IP): Performed by: INTERNAL MEDICINE

## 2024-11-23 PROCEDURE — 2500000003 HC RX 250 WO HCPCS: Performed by: INTERNAL MEDICINE

## 2024-11-23 PROCEDURE — 83735 ASSAY OF MAGNESIUM: CPT

## 2024-11-23 PROCEDURE — 2580000003 HC RX 258: Performed by: INTERNAL MEDICINE

## 2024-11-23 PROCEDURE — 82947 ASSAY GLUCOSE BLOOD QUANT: CPT

## 2024-11-23 PROCEDURE — 6370000000 HC RX 637 (ALT 250 FOR IP): Performed by: STUDENT IN AN ORGANIZED HEALTH CARE EDUCATION/TRAINING PROGRAM

## 2024-11-23 PROCEDURE — 80048 BASIC METABOLIC PNL TOTAL CA: CPT

## 2024-11-23 PROCEDURE — 36415 COLL VENOUS BLD VENIPUNCTURE: CPT

## 2024-11-23 RX ORDER — MAGNESIUM SULFATE IN WATER 40 MG/ML
2000 INJECTION, SOLUTION INTRAVENOUS ONCE
Status: COMPLETED | OUTPATIENT
Start: 2024-11-23 | End: 2024-11-23

## 2024-11-23 RX ORDER — POTASSIUM CHLORIDE 7.45 MG/ML
10 INJECTION INTRAVENOUS
Status: COMPLETED | OUTPATIENT
Start: 2024-11-23 | End: 2024-11-24

## 2024-11-23 RX ORDER — POTASSIUM CHLORIDE 1500 MG/1
40 TABLET, EXTENDED RELEASE ORAL ONCE
Status: COMPLETED | OUTPATIENT
Start: 2024-11-23 | End: 2024-11-23

## 2024-11-23 RX ADMIN — POTASSIUM CHLORIDE 40 MEQ: 1500 TABLET, EXTENDED RELEASE ORAL at 16:36

## 2024-11-23 RX ADMIN — POTASSIUM CHLORIDE 10 MEQ: 7.45 INJECTION INTRAVENOUS at 23:59

## 2024-11-23 RX ADMIN — POTASSIUM CHLORIDE 10 MEQ: 7.45 INJECTION INTRAVENOUS at 18:02

## 2024-11-23 RX ADMIN — POTASSIUM CHLORIDE 10 MEQ: 7.45 INJECTION INTRAVENOUS at 16:50

## 2024-11-23 RX ADMIN — INSULIN LISPRO 10 UNITS: 100 INJECTION, SOLUTION INTRAVENOUS; SUBCUTANEOUS at 13:31

## 2024-11-23 RX ADMIN — POTASSIUM CHLORIDE 10 MEQ: 7.45 INJECTION INTRAVENOUS at 23:56

## 2024-11-23 RX ADMIN — POTASSIUM CHLORIDE 10 MEQ: 7.45 INJECTION INTRAVENOUS at 06:55

## 2024-11-23 RX ADMIN — POTASSIUM CHLORIDE 10 MEQ: 7.45 INJECTION INTRAVENOUS at 05:55

## 2024-11-23 RX ADMIN — POTASSIUM CHLORIDE 10 MEQ: 7.45 INJECTION INTRAVENOUS at 22:55

## 2024-11-23 RX ADMIN — INSULIN LISPRO 10 UNITS: 100 INJECTION, SOLUTION INTRAVENOUS; SUBCUTANEOUS at 18:00

## 2024-11-23 RX ADMIN — INSULIN LISPRO 1 UNITS: 100 INJECTION, SOLUTION INTRAVENOUS; SUBCUTANEOUS at 18:00

## 2024-11-23 RX ADMIN — POTASSIUM CHLORIDE 10 MEQ: 7.45 INJECTION INTRAVENOUS at 19:04

## 2024-11-23 RX ADMIN — SODIUM PHOSPHATE, MONOBASIC, MONOHYDRATE AND SODIUM PHOSPHATE, DIBASIC, ANHYDROUS 15 MMOL: 142; 276 INJECTION, SOLUTION INTRAVENOUS at 09:06

## 2024-11-23 RX ADMIN — POTASSIUM CHLORIDE 10 MEQ: 7.45 INJECTION INTRAVENOUS at 08:10

## 2024-11-23 RX ADMIN — POTASSIUM CHLORIDE 10 MEQ: 7.45 INJECTION INTRAVENOUS at 20:44

## 2024-11-23 RX ADMIN — INSULIN LISPRO 10 UNITS: 100 INJECTION, SOLUTION INTRAVENOUS; SUBCUTANEOUS at 09:01

## 2024-11-23 RX ADMIN — INSULIN GLARGINE 30 UNITS: 100 INJECTION, SOLUTION SUBCUTANEOUS at 20:52

## 2024-11-23 RX ADMIN — ENOXAPARIN SODIUM 40 MG: 100 INJECTION SUBCUTANEOUS at 09:01

## 2024-11-23 RX ADMIN — POTASSIUM CHLORIDE 10 MEQ: 7.45 INJECTION INTRAVENOUS at 04:50

## 2024-11-23 RX ADMIN — MAGNESIUM SULFATE HEPTAHYDRATE 2000 MG: 40 INJECTION, SOLUTION INTRAVENOUS at 10:24

## 2024-11-23 RX ADMIN — POTASSIUM CHLORIDE 10 MEQ: 7.45 INJECTION INTRAVENOUS at 09:07

## 2024-11-23 ASSESSMENT — PAIN SCALES - GENERAL
PAINLEVEL_OUTOF10: 0
PAINLEVEL_OUTOF10: 0

## 2024-11-24 VITALS
SYSTOLIC BLOOD PRESSURE: 109 MMHG | RESPIRATION RATE: 19 BRPM | HEART RATE: 82 BPM | BODY MASS INDEX: 26.08 KG/M2 | HEIGHT: 64 IN | DIASTOLIC BLOOD PRESSURE: 72 MMHG | TEMPERATURE: 97.5 F | OXYGEN SATURATION: 100 % | WEIGHT: 152.78 LBS

## 2024-11-24 LAB
ALBUMIN SERPL-MCNC: 2.9 G/DL (ref 3.4–5)
ALBUMIN/GLOB SERPL: 1.9 {RATIO} (ref 1.1–2.2)
ALP SERPL-CCNC: 54 U/L (ref 40–129)
ALT SERPL-CCNC: 12 U/L (ref 10–40)
ANION GAP SERPL CALCULATED.3IONS-SCNC: 7 MMOL/L (ref 9–17)
AST SERPL-CCNC: 20 U/L (ref 15–37)
BILIRUB SERPL-MCNC: 0.3 MG/DL (ref 0–1)
BUN SERPL-MCNC: 6 MG/DL (ref 7–20)
CALCIUM SERPL-MCNC: 8.5 MG/DL (ref 8.3–10.6)
CHLORIDE SERPL-SCNC: 109 MMOL/L (ref 99–110)
CO2 SERPL-SCNC: 21 MMOL/L (ref 21–32)
CREAT SERPL-MCNC: 0.6 MG/DL (ref 0.9–1.3)
ERYTHROCYTE [DISTWIDTH] IN BLOOD BY AUTOMATED COUNT: 16.3 % (ref 11.7–14.9)
GFR, ESTIMATED: >90 ML/MIN/1.73M2
GLUCOSE BLD-MCNC: 113 MG/DL (ref 74–99)
GLUCOSE BLD-MCNC: 125 MG/DL (ref 74–99)
GLUCOSE BLD-MCNC: 153 MG/DL (ref 74–99)
GLUCOSE BLD-MCNC: 174 MG/DL (ref 74–99)
GLUCOSE SERPL-MCNC: 122 MG/DL (ref 74–99)
HCT VFR BLD AUTO: 32.7 % (ref 42–52)
HGB BLD-MCNC: 11.6 G/DL (ref 13.5–18)
MAGNESIUM SERPL-MCNC: 1.9 MG/DL (ref 1.8–2.4)
MCH RBC QN AUTO: 29.1 PG (ref 27–31)
MCHC RBC AUTO-ENTMCNC: 35.5 G/DL (ref 32–36)
MCV RBC AUTO: 82 FL (ref 78–100)
PHOSPHATE SERPL-MCNC: 2.7 MG/DL (ref 2.5–4.9)
PLATELET # BLD AUTO: 190 K/UL (ref 140–440)
PMV BLD AUTO: 9.9 FL (ref 7.5–11.1)
POTASSIUM SERPL-SCNC: 3.1 MMOL/L (ref 3.5–5.1)
POTASSIUM SERPL-SCNC: 3.2 MMOL/L (ref 3.5–5.1)
POTASSIUM SERPL-SCNC: 3.7 MMOL/L (ref 3.5–5.1)
PROT SERPL-MCNC: 4.4 G/DL (ref 6.4–8.2)
RBC # BLD AUTO: 3.99 M/UL (ref 4.6–6.2)
SODIUM SERPL-SCNC: 138 MMOL/L (ref 136–145)
WBC OTHER # BLD: 3 K/UL (ref 4–10.5)

## 2024-11-24 PROCEDURE — 84132 ASSAY OF SERUM POTASSIUM: CPT

## 2024-11-24 PROCEDURE — 36415 COLL VENOUS BLD VENIPUNCTURE: CPT

## 2024-11-24 PROCEDURE — 6370000000 HC RX 637 (ALT 250 FOR IP): Performed by: INTERNAL MEDICINE

## 2024-11-24 PROCEDURE — 83735 ASSAY OF MAGNESIUM: CPT

## 2024-11-24 PROCEDURE — 6360000002 HC RX W HCPCS: Performed by: INTERNAL MEDICINE

## 2024-11-24 PROCEDURE — 84100 ASSAY OF PHOSPHORUS: CPT

## 2024-11-24 PROCEDURE — 80053 COMPREHEN METABOLIC PANEL: CPT

## 2024-11-24 PROCEDURE — 85027 COMPLETE CBC AUTOMATED: CPT

## 2024-11-24 PROCEDURE — 6360000002 HC RX W HCPCS: Performed by: STUDENT IN AN ORGANIZED HEALTH CARE EDUCATION/TRAINING PROGRAM

## 2024-11-24 PROCEDURE — 94761 N-INVAS EAR/PLS OXIMETRY MLT: CPT

## 2024-11-24 PROCEDURE — 82947 ASSAY GLUCOSE BLOOD QUANT: CPT

## 2024-11-24 PROCEDURE — 6370000000 HC RX 637 (ALT 250 FOR IP): Performed by: STUDENT IN AN ORGANIZED HEALTH CARE EDUCATION/TRAINING PROGRAM

## 2024-11-24 RX ORDER — POTASSIUM CHLORIDE 1500 MG/1
40 TABLET, EXTENDED RELEASE ORAL ONCE
Status: COMPLETED | OUTPATIENT
Start: 2024-11-24 | End: 2024-11-24

## 2024-11-24 RX ORDER — POTASSIUM CHLORIDE 7.45 MG/ML
10 INJECTION INTRAVENOUS
Status: DISPENSED | OUTPATIENT
Start: 2024-11-24 | End: 2024-11-24

## 2024-11-24 RX ORDER — GLUCAGON 1 MG/ML
1 KIT INJECTION PRN
Qty: 1 EACH | Refills: 1 | Status: SHIPPED | OUTPATIENT
Start: 2024-11-24

## 2024-11-24 RX ORDER — ACETAMINOPHEN 325 MG/1
650 TABLET ORAL EVERY 4 HOURS PRN
Status: DISCONTINUED | OUTPATIENT
Start: 2024-11-24 | End: 2024-11-24 | Stop reason: HOSPADM

## 2024-11-24 RX ADMIN — POTASSIUM CHLORIDE 40 MEQ: 1500 TABLET, EXTENDED RELEASE ORAL at 10:34

## 2024-11-24 RX ADMIN — INSULIN LISPRO 10 UNITS: 100 INJECTION, SOLUTION INTRAVENOUS; SUBCUTANEOUS at 13:50

## 2024-11-24 RX ADMIN — ACETAMINOPHEN 650 MG: 325 TABLET ORAL at 10:33

## 2024-11-24 RX ADMIN — INSULIN LISPRO 10 UNITS: 100 INJECTION, SOLUTION INTRAVENOUS; SUBCUTANEOUS at 09:53

## 2024-11-24 RX ADMIN — POTASSIUM CHLORIDE 10 MEQ: 7.45 INJECTION INTRAVENOUS at 12:14

## 2024-11-24 RX ADMIN — ENOXAPARIN SODIUM 40 MG: 100 INJECTION SUBCUTANEOUS at 09:53

## 2024-11-24 ASSESSMENT — PAIN DESCRIPTION - DESCRIPTORS: DESCRIPTORS: ACHING;DISCOMFORT

## 2024-11-24 ASSESSMENT — PAIN SCALES - GENERAL
PAINLEVEL_OUTOF10: 0
PAINLEVEL_OUTOF10: 2
PAINLEVEL_OUTOF10: 3
PAINLEVEL_OUTOF10: 3

## 2024-11-24 ASSESSMENT — PAIN SCALES - WONG BAKER: WONGBAKER_NUMERICALRESPONSE: HURTS A LITTLE BIT

## 2024-11-24 NOTE — DISCHARGE SUMMARY
V2.0  Discharge Summary    Name:  Adrianna Landin /Age/Sex: 1985 (39 y.o. male)   Admit Date: 2024  Discharge Date: 24    MRN & CSN:  [id number] & [id number] Encounter Date and Time 24 3:12 PM EST    Attending:  Jorge Sunshine MD Discharging Provider: Jorge Sunshine MD       Hospital Course:     Brief HPI: As per admitting provider, \"40yo male with insulin-dependent DM presents with DKA. He is a [place] immigrant and has not taken his home insulin in months and then began feeling nauseated and experienced headache and abd pain. Found to have glucose >350 at OSH ([facility]).      Labs and radiographic imaging reviewed personally by myself and case discussed with ED physician in detail. Agree with ICU admission. \"    Patient was admitted to the ICU and treated for insulin drip protocol per critical care/endocrinologist, subsequently bridged with Lantus and currently on Lantus/Premeal lispro 10 units 3 times daily and sliding scale insulin.  Patient was out of insulin leading to development of DKA and currently fingerstick glucose are not significantly raised/DKA has resolved.  And okay to discharge patient.  Patient provided with diabetic supplies including Lantus and lispro pens and extensive discharge information with instructions to follow-up.    Brief Problem Based Course:     DKA-resolved  -Pt on insulin gt protoocol in ICU  -Endo consult onboard  -replete electrolytes as needed  -antiemetics prn  -get routine labs/ r/o infectious/ intra abdomen pathology     2.Hypokalemia-resolved status post repletion  -replete to keep K >4     3. Hypomagesemia-status post repletion  -replete mag to keep level >2    The patient expressed appropriate understanding of, and agreement with the discharge recommendations, medications, and plan.     Consults this admission:  IP CONSULT TO DIABETES EDUCATOR  IP CONSULT TO ENDOCRINOLOGY  IP CONSULT TO SOCIAL WORK    Discharge Diagnosis:   DKA, type 2, not at goal

## 2024-11-24 NOTE — PLAN OF CARE
Problem: Chronic Conditions and Co-morbidities  Goal: Patient's chronic conditions and co-morbidity symptoms are monitored and maintained or improved  11/24/2024 0143 by Britney Bowman RN  Outcome: Progressing  Flowsheets (Taken 11/23/2024 2030)  Care Plan - Patient's Chronic Conditions and Co-Morbidity Symptoms are Monitored and Maintained or Improved:   Monitor and assess patient's chronic conditions and comorbid symptoms for stability, deterioration, or improvement   Collaborate with multidisciplinary team to address chronic and comorbid conditions and prevent exacerbation or deterioration   Update acute care plan with appropriate goals if chronic or comorbid symptoms are exacerbated and prevent overall improvement and discharge  11/23/2024 1737 by Thi Sinha RN  Outcome: Progressing     Problem: Discharge Planning  Goal: Discharge to home or other facility with appropriate resources  11/24/2024 0143 by Britney Bowman RN  Outcome: Progressing  Flowsheets (Taken 11/23/2024 2030)  Discharge to home or other facility with appropriate resources:   Identify barriers to discharge with patient and caregiver   Arrange for needed discharge resources and transportation as appropriate   Identify discharge learning needs (meds, wound care, etc)  11/23/2024 1737 by Thi Sinha RN  Outcome: Progressing     Problem: Skin/Tissue Integrity  Goal: Absence of new skin breakdown  Description: 1.  Monitor for areas of redness and/or skin breakdown  2.  Assess vascular access sites hourly  3.  Every 4-6 hours minimum:  Change oxygen saturation probe site  4.  Every 4-6 hours:  If on nasal continuous positive airway pressure, respiratory therapy assess nares and determine need for appliance change or resting period.  11/24/2024 0143 by Britney Bowman RN  Outcome: Progressing  11/23/2024 1737 by Thi Sinha RN  Outcome: Progressing

## 2024-11-25 NOTE — CARE COORDINATION
Received referral from CM to assist patient with discharge medications.  Patient received assistance previously and is currently on the flagged list.  After discussion with supervisor, approved a 1x voucher with a limit of $100.00 and was able to cover 2 medications and one month supply of pen needles for the insulin.  Faxed voucher to [facility].     Patient must contact [facility] office at [phone] and complete application and provide requested documentation including proof of residency if further assistance is needed.      Patient remains on flagged list.

## 2024-11-25 NOTE — PROGRESS NOTES
V2.0    [facility] [place] Progress Note      Name:  Adrianna Landin /Age/Sex: 1985  (39 y.o. male)   MRN & CSN:  [id number] & [id number] Encounter Date/Time: 2024 1:37 PM EST   Location:  -A PCP: No primary care provider on file.     Attending:Jorge Sunshine MD       Hospital Day: 4    Assessment and Recommendations   Adrianna Landin is a 39 y.o. male with pmh of DM who presents with DKA, type 2, not at goal (HCC)      Plan:   DKA:  -Pt on insulin gt protoocol in ICU  -Endo consult onboard  -replete electrolytes as needed  -antiemetics prn  -get routine labs/ r/o infectious/ intra abdomen pathology    2.Hypokalemia:  -replete to keep K >4    3. Hypomagesemia-  -replete mag to keep level >2    Diet ADULT DIET; Regular; 4 carb choices (60 gm/meal)   DVT Prophylaxis [x] Lovenox, []  Heparin, [] SCDs, [] Ambulation,  [] Eliquis, [] Xarelto  [] Coumadin   Code Status Full Code   Disposition From: Home  Expected Disposition:   Estimated Date of Discharge:   Patient requires continued admission due to    Surrogate Decision Maker/ POA       Personally reviewed Lab Studies and Imaging       Subjective:     Chief Complaint: Nausea, headache and abdomen pain    Adrianna Landin is a 39 y.o. male who presents with DKA      Review of Systems:      Pertinent positives and negatives discussed in HPI    Objective:     Intake/Output Summary (Last 24 hours) at 2024 0829  Last data filed at 2024 0527  Gross per 24 hour   Intake 4468.3 ml   Output 300 ml   Net 4168.3 ml      Vitals:   Vitals:    24 0200 24 0400 24 0435 24 0527   BP:       Pulse: 69 72 70    Resp:   11    Temp:       TempSrc:       SpO2:       Weight:    69.3 kg (152 lb 12.5 oz)   Height:             Physical Exam:      General: NAD  Eyes: EOMI  ENT: neck supple  Cardiovascular: Regular rate.  Respiratory: Clear to auscultation  Gastrointestinal: Soft, non tender  Genitourinary: no suprapubic tenderness  Musculoskeletal: No 
    V2.0    [facility] [place] Progress Note      Name:  Adrianna Landin /Age/Sex: 1985  (39 y.o. male)   MRN & CSN:  [id number] & [id number] Encounter Date/Time: 2024 1:37 PM EST   Location:  -A PCP: No primary care provider on file.     Attending:Jorge Sunshine MD       Hospital Day: 3    Assessment and Recommendations   Adrianna Landin is a 39 y.o. male with pmh of DM who presents with DKA, type 2, not at goal (HCC)      Plan:   DKA:  -Pt on insulin gt protoocol in ICU  -Endo consult onboard  -replete electrolytes as needed  -antiemetics prn  -get routine labs/ r/o infectious/ intra abdomen pathology    2.Hypokalemia:  -replete to keep K >4    3. Hypomagesemia-  -replete mag to keep level >2    Diet ADULT DIET; Regular; 4 carb choices (60 gm/meal)   DVT Prophylaxis [x] Lovenox, []  Heparin, [] SCDs, [] Ambulation,  [] Eliquis, [] Xarelto  [] Coumadin   Code Status Full Code   Disposition From: Home  Expected Disposition:   Estimated Date of Discharge:   Patient requires continued admission due to    Surrogate Decision Maker/ POA       Personally reviewed Lab Studies and Imaging       Subjective:     Chief Complaint: Nausea, headache and abdomen pain    Adrianna Landin is a 39 y.o. male who presents with DKA      Review of Systems:      Pertinent positives and negatives discussed in HPI    Objective:     Intake/Output Summary (Last 24 hours) at 2024 0925  Last data filed at 2024 0908  Gross per 24 hour   Intake 360.39 ml   Output --   Net 360.39 ml      Vitals:   Vitals:    24 0300 24 0543 24 0737 24 0811   BP: 102/70  (!) 93/59    Pulse: 74  85 83   Resp: 12  13    Temp: 98.1 °F (36.7 °C)  97.2 °F (36.2 °C)    TempSrc: Oral  Oral    SpO2: 99%  97%    Weight:  62 kg (136 lb 11 oz)     Height:             Physical Exam:      General: NAD  Eyes: EOMI  ENT: neck supple  Cardiovascular: Regular rate.  Respiratory: Clear to auscultation  Gastrointestinal: Soft, non 
  Progress Note( Dr. Cutler)    Subjective:   Admit Date: 11/21/2024  PCP: No primary care provider on file.    Admitted For :   Hyperglycemia patient with DKA due to not taking medicine     Consulted For: Better control of blood glucose     Interval History: Feels much better has hypokalemia that need to be corrected  Afterwards she can be discharged    Denies any chest pains,   Denies SOB .   Denies nausea or vomiting.   No new bowel or bladder symptoms.     No intake or output data in the 24 hours ending 11/25/24 0642      DATA    CBC:   Recent Labs     11/24/24  0911   WBC 3.0*   HGB 11.6*       CMP:  Recent Labs     11/23/24  0129 11/23/24  1329 11/24/24  0911 11/24/24  0946 11/24/24  1414   * 135* 138  --   --    K 2.5* 3.0* 3.2* 3.1* 3.7    106 109  --   --    CO2 18* 19* 21  --   --    BUN 7 7 6*  --   --    CREATININE 0.7* 0.6* 0.6*  --   --    CALCIUM 8.4 8.3 8.5  --   --    BILITOT  --   --  0.3  --   --    ALKPHOS  --   --  54  --   --    AST  --   --  20  --   --    ALT  --   --  12  --   --      Lipids: No results found for: \"CHOL\", \"HDL\", \"TRIG\"  Glucose:  Recent Labs     11/24/24  0606 11/24/24  0948 11/24/24  1235   POCGLU 125* 174* 153*     [id number]:  Lab Results   Component Value Date/Time    LABA1C 17.4 11/22/2024 01:00 AM     High Sensitivity TSH: No results found for: \"TSHHS\"  Free T3: No results found for: \"FT3\"  Free T4:No results found for: \"T4FREE\"    XR CHEST PORTABLE   Final Result   No acute findings in the chest         Electronically signed by Bubba Blunt           Scheduled Medicines   Medications:        Infusions:           Objective:   Vitals: /72   Pulse 82   Temp 97.5 °F (36.4 °C)   Resp 19   Ht 1.626 m (5' 4\")   Wt 69.3 kg (152 lb 12.5 oz)   SpO2 100%   BMI 26.22 kg/m²   General appearance: alert and cooperative with exam  Neck: no JVD or bruit  Thyroid : Normal lobes   Lungs: Has Vesicular Breath sounds   Heart:  regular rate and 
  Progress Note( Dr. Cutler)  11/23/2024  Subjective:   Admit Date: 11/21/2024  PCP: No primary care provider on file.    Admitted For :   Hyperglycemia patient with DKA due to not taking medicine     Consulted For: Better control of blood glucose     Interval History: Feels much better has hypokalemia that need to be corrected  Afterwards she can be discharged    Denies any chest pains,   Denies SOB .   Denies nausea or vomiting.   No new bowel or bladder symptoms.       Intake/Output Summary (Last 24 hours) at 11/23/2024 0922  Last data filed at 11/23/2024 0908  Gross per 24 hour   Intake 360.39 ml   Output --   Net 360.39 ml       DATA    CBC: No results for input(s): \"WBC\", \"HGB\", \"PLT\" in the last 72 hours. CMP:  Recent Labs     11/22/24  0100 11/22/24  0640 11/22/24  1734 11/23/24  0129   * 136  --  134*   K 3.5 2.9* 3.2* 2.5*    107  --  104   CO2 9* 12*  --  18*   BUN 8 7  --  7   CREATININE 1.1 0.9  --  0.7*   CALCIUM 8.6 8.5  --  8.4     Lipids: No results found for: \"CHOL\", \"HDL\", \"TRIG\"  Glucose:  Recent Labs     11/22/24  1605 11/22/24  2007 11/23/24  0805   POCGLU 161* 187* 97     [id number]:  Lab Results   Component Value Date/Time    LABA1C 17.4 11/22/2024 01:00 AM     High Sensitivity TSH: No results found for: \"TSHHS\"  Free T3: No results found for: \"FT3\"  Free T4:No results found for: \"T4FREE\"    XR CHEST PORTABLE   Final Result   No acute findings in the chest         Electronically signed by Bubba Blunt           Scheduled Medicines   Medications:    insulin lispro  10 Units SubCUTAneous TID WC    insulin glargine  30 Units SubCUTAneous Nightly    insulin lispro  0-4 Units SubCUTAneous 4x Daily WC    enoxaparin  40 mg SubCUTAneous Daily      Infusions:    dextrose      sodium chloride 250 mL/hr at 11/22/24 0823    dextrose 5 % and 0.45 % NaCl 150 mL/hr at 11/22/24 0654         Objective:   Vitals: BP (!) 93/59   Pulse 83   Temp 97.2 °F (36.2 °C) (Oral)   Resp 13   Ht 1.626 m 
4 Eyes Skin Assessment     NAME:  Adrianna Landin  YOB: 1985  MEDICAL RECORD NUMBER:  [id number]    The patient is being assessed for  Admission    I agree that at least one RN has performed a thorough Head to Toe Skin Assessment on the patient. ALL assessment sites listed below have been assessed.      Areas assessed by both nurses:    Head, Face, Ears, Shoulders, Back, Chest, Arms, Elbows, Hands, Sacrum. Buttock, Coccyx, Ischium, Legs. Feet and Heels, and Under Medical Devices         Does the Patient have a Wound? No noted wound(s)       Jesus Prevention initiated by RN: No  Wound Care Orders initiated by RN: No    Pressure Injury (Stage 3,4, Unstageable, DTI, NWPT, and Complex wounds) if present, place Wound referral order by RN under : No    New Ostomies, if present place, Ostomy referral order under : No     Nurse 1 eSignature: Electronically signed by Shane Aceves RN on 11/22/24 at 1:36 AM EST    **SHARE this note so that the co-signing nurse can place an eSignature**    Nurse 2 eSignature: Electronically signed by Nabila Mares RN on 11/22/24 at 1:37 AM EST    
Patient educated on discharge instructions, medications, and follow-up appointments. All questions answered and issues addressed. IV removed without difficulty.   
Spiritual Health History and Assessment/Progress Note  [facility]    Spiritual/Emotional Needs,  ,  ,      Name: Adrianna Landin MRN: [id number]    Age: 39 y.o.     Sex: male   Language: Filipino creole   Cheondoism: None   DKA, type 2, not at goal (HCC)     Date: 11/22/2024            Total Time Calculated: 12 min              [facility]        Referral/Consult From: Rounding   Encounter Overview/Reason: Spiritual/Emotional Needs  Service Provided For: Patient    Alaina, Belief, Meaning:   Patient Other: No particular alaina affiliation  Family/Friends No family/friends present      Importance and Influence:  Patient unable to assess at this time  Family/Friends No family/friends present    Community:  Patient feels well-supported. Support system includes: Parent/s  Family/Friends No family/friends present    Assessment and Plan of Care:     Patient Interventions include: Facilitated expression of thoughts and feelings  Family/Friends Interventions include: No family/friends present    Patient Plan of Care: Spiritual Care available upon further referral  Family/Friends Plan of Care: No family/friends present    Electronically signed by Chaplain Joni on 11/22/2024 at 3:47 PM   
SubCUTAneous Nightly    insulin lispro  0-4 Units SubCUTAneous 4x Daily WC    enoxaparin  40 mg SubCUTAneous Daily       Labs, Imaging and Studies reviewed:    Results       ** No results found for the last 336 hours. **            No results found.    Recent Results (from the past 24 hour(s))   POCT Glucose    Collection Time: 11/21/24 10:48 PM   Result Value Ref Range    POC Glucose 214 (H) 74 - 99 mg/dL   POCT Glucose    Collection Time: 11/22/24 12:00 AM   Result Value Ref Range    POC Glucose 161 (H) 74 - 99 mg/dL   POCT Glucose    Collection Time: 11/22/24 12:55 AM   Result Value Ref Range    POC Glucose 156 (H) 74 - 99 mg/dL   Basic Metabolic Panel    Collection Time: 11/22/24  1:00 AM   Result Value Ref Range    Sodium 134 (L) 136 - 145 mmol/L    Potassium 3.5 3.5 - 5.1 mmol/L    Chloride 104 99 - 110 mmol/L    CO2 9 (LL) 21 - 32 mmol/L    Anion Gap 21 (H) 9 - 17 mmol/L    Glucose 168 (H) 74 - 99 mg/dL    BUN 8 7 - 20 mg/dL    Creatinine 1.1 0.9 - 1.3 mg/dL    Est, Glom Filt Rate 75 >60 mL/min/1.73m2    Calcium 8.6 8.3 - 10.6 mg/dL   Magnesium    Collection Time: 11/22/24  1:00 AM   Result Value Ref Range    Magnesium 1.8 1.8 - 2.4 mg/dL   Phosphorus    Collection Time: 11/22/24  1:00 AM   Result Value Ref Range    Phosphorus 1.9 (L) 2.5 - 4.9 mg/dL   Hemoglobin A1c    Collection Time: 11/22/24  1:00 AM   Result Value Ref Range    Hemoglobin A1C 17.4 (H) 4.2 - 6.3 %    Estimated Avg Glucose 453 mg/dL   Beta-Hydroxybutyrate    Collection Time: 11/22/24  1:00 AM   Result Value Ref Range    Beta-Hydroxybutyrate 5.82 (H) 0.00 - 0.27 mmol/L   POCT Glucose    Collection Time: 11/22/24  1:57 AM   Result Value Ref Range    POC Glucose 154 (H) 74 - 99 mg/dL   POCT Glucose    Collection Time: 11/22/24  2:57 AM   Result Value Ref Range    POC Glucose 161 (H) 74 - 99 mg/dL   POCT Glucose    Collection Time: 11/22/24  3:57 AM   Result Value Ref Range    POC Glucose 154 (H) 74 - 99 mg/dL   POCT Glucose    Collection Time:

## 2025-04-18 ENCOUNTER — HOSPITAL ENCOUNTER (INPATIENT)
Age: 40
LOS: 1 days | Discharge: HOME | DRG: 638 | End: 2025-04-19
Attending: STUDENT IN AN ORGANIZED HEALTH CARE EDUCATION/TRAINING PROGRAM | Admitting: SPECIALIST
Payer: MEDICAID

## 2025-04-18 DIAGNOSIS — E87.5 HYPERKALEMIA: ICD-10-CM

## 2025-04-18 DIAGNOSIS — N17.9 AKI (ACUTE KIDNEY INJURY): ICD-10-CM

## 2025-04-18 DIAGNOSIS — E11.10 DIABETIC KETOACIDOSIS WITHOUT COMA ASSOCIATED WITH TYPE 2 DIABETES MELLITUS (HCC): Primary | ICD-10-CM

## 2025-04-18 LAB
ALBUMIN SERPL-MCNC: 4.8 G/DL (ref 3.4–5)
ALBUMIN/GLOB SERPL: 1.4 {RATIO} (ref 1.1–2.2)
ALP SERPL-CCNC: 86 U/L (ref 40–129)
ALT SERPL-CCNC: 46 U/L (ref 10–40)
ANION GAP SERPL CALCULATED.3IONS-SCNC: 15 MMOL/L (ref 9–17)
ANION GAP SERPL CALCULATED.3IONS-SCNC: 29 MMOL/L (ref 9–17)
ARTERIAL PATENCY WRIST A: ABNORMAL
AST SERPL-CCNC: 22 U/L (ref 15–37)
B-OH-BUTYR SERPL-MCNC: 3.43 MMOL/L (ref 0–0.27)
B-OH-BUTYR SERPL-MCNC: >8 MMOL/L (ref 0–0.27)
BASOPHILS # BLD: 0.03 K/UL
BASOPHILS NFR BLD: 0 % (ref 0–1)
BILIRUB DIRECT SERPL-MCNC: <0.2 MG/DL (ref 0–0.3)
BILIRUB INDIRECT SERPL-MCNC: ABNORMAL MG/DL (ref 0–0.7)
BILIRUB SERPL-MCNC: 0.6 MG/DL (ref 0–1)
BILIRUB UR QL STRIP: NEGATIVE
BNP SERPL-MCNC: <36 PG/ML (ref 0–125)
BODY TEMPERATURE: 37
BUN SERPL-MCNC: 11 MG/DL (ref 7–20)
BUN SERPL-MCNC: 17 MG/DL (ref 7–20)
CALCIUM SERPL-MCNC: 11.4 MG/DL (ref 8.3–10.6)
CALCIUM SERPL-MCNC: 8.9 MG/DL (ref 8.3–10.6)
CASTS #/AREA URNS LPF: NORMAL /LPF
CHLORIDE SERPL-SCNC: 101 MMOL/L (ref 99–110)
CHLORIDE SERPL-SCNC: 89 MMOL/L (ref 99–110)
CHP ED QC CHECK: ABNORMAL
CLARITY UR: CLEAR
CO2 SERPL-SCNC: 15 MMOL/L (ref 21–32)
CO2 SERPL-SCNC: 19 MMOL/L (ref 21–32)
COHGB MFR BLD: 0.5 % (ref 0.5–1.5)
COLOR UR: YELLOW
CREAT SERPL-MCNC: 1 MG/DL (ref 0.9–1.3)
CREAT SERPL-MCNC: 1.5 MG/DL (ref 0.9–1.3)
EKG ATRIAL RATE: 119 BPM
EKG DIAGNOSIS: NORMAL
EKG P AXIS: 49 DEGREES
EKG P-R INTERVAL: 136 MS
EKG Q-T INTERVAL: 348 MS
EKG QRS DURATION: 72 MS
EKG QTC CALCULATION (BAZETT): 489 MS
EKG R AXIS: 66 DEGREES
EKG T AXIS: 32 DEGREES
EKG VENTRICULAR RATE: 119 BPM
EOSINOPHIL # BLD: 0.02 K/UL
EOSINOPHILS RELATIVE PERCENT: 0 % (ref 0–3)
ERYTHROCYTE [DISTWIDTH] IN BLOOD BY AUTOMATED COUNT: 13.9 % (ref 11.7–14.9)
EST. AVERAGE GLUCOSE BLD GHB EST-MCNC: 418 MG/DL
GAS FLOW.O2 O2 DELIVERY SYS: ABNORMAL L/MIN
GFR, ESTIMATED: 53 ML/MIN/1.73M2
GFR, ESTIMATED: 87 ML/MIN/1.73M2
GLUCOSE BLD-MCNC: 178 MG/DL (ref 74–99)
GLUCOSE BLD-MCNC: 220 MG/DL (ref 74–99)
GLUCOSE BLD-MCNC: 234 MG/DL (ref 74–99)
GLUCOSE BLD-MCNC: 253 MG/DL (ref 74–99)
GLUCOSE BLD-MCNC: 292 MG/DL (ref 74–99)
GLUCOSE BLD-MCNC: 315 MG/DL (ref 74–99)
GLUCOSE BLD-MCNC: 342 MG/DL (ref 74–99)
GLUCOSE BLD-MCNC: 463 MG/DL (ref 74–99)
GLUCOSE BLD-MCNC: ABNORMAL MG/DL
GLUCOSE SERPL-MCNC: 291 MG/DL (ref 74–99)
GLUCOSE SERPL-MCNC: 746 MG/DL (ref 74–99)
GLUCOSE UR STRIP-MCNC: >=1000 MG/DL
HBA1C MFR BLD: 16.2 % (ref 4.2–6.3)
HCO3 VENOUS: 14.2 MMOL/L (ref 22–29)
HCT VFR BLD AUTO: 51.2 % (ref 42–52)
HGB BLD-MCNC: 17.4 G/DL (ref 13.5–18)
HGB UR QL STRIP.AUTO: ABNORMAL
IMM GRANULOCYTES # BLD AUTO: 0.02 K/UL
IMM GRANULOCYTES NFR BLD: 0 %
KETONES UR STRIP-MCNC: >80 MG/DL
LEUKOCYTE ESTERASE UR QL STRIP: NEGATIVE
LIPASE SERPL-CCNC: 27 U/L (ref 13–60)
LYMPHOCYTES NFR BLD: 2.6 K/UL
LYMPHOCYTES RELATIVE PERCENT: 29 % (ref 24–44)
MAGNESIUM SERPL-MCNC: 1.8 MG/DL (ref 1.8–2.4)
MCH RBC QN AUTO: 28.1 PG (ref 27–31)
MCHC RBC AUTO-ENTMCNC: 34 G/DL (ref 32–36)
MCV RBC AUTO: 82.6 FL (ref 78–100)
METHEMOGLOBIN: 0.4 % (ref 0.5–1.5)
MONOCYTES NFR BLD: 0.51 K/UL
MONOCYTES NFR BLD: 6 % (ref 0–5)
NEGATIVE BASE EXCESS, VEN: 13 MMOL/L (ref 0–3)
NEUTROPHILS NFR BLD: 65 % (ref 36–66)
NEUTS SEG NFR BLD: 5.91 K/UL
NITRITE UR QL STRIP: NEGATIVE
OXYHGB MFR BLD: 76.7 %
PCO2 VENOUS: 37.3 MM HG (ref 38–54)
PH UR STRIP: 5.5 [PH] (ref 5–8)
PH VENOUS: 7.2 (ref 7.32–7.43)
PHOSPHATE SERPL-MCNC: 2.4 MG/DL (ref 2.5–4.9)
PLATELET # BLD AUTO: 357 K/UL (ref 140–440)
PMV BLD AUTO: 10.8 FL (ref 7.5–11.1)
PO2 VENOUS: 46.9 MM HG (ref 23–48)
POTASSIUM SERPL-SCNC: 3.8 MMOL/L (ref 3.5–5.1)
POTASSIUM SERPL-SCNC: 5.4 MMOL/L (ref 3.5–5.1)
PROT SERPL-MCNC: 8.2 G/DL (ref 6.4–8.2)
PROT UR STRIP-MCNC: ABNORMAL MG/DL
RBC # BLD AUTO: 6.2 M/UL (ref 4.6–6.2)
RBC #/AREA URNS HPF: <1 /HPF (ref 0–2)
SODIUM SERPL-SCNC: 133 MMOL/L (ref 136–145)
SODIUM SERPL-SCNC: 135 MMOL/L (ref 136–145)
SP GR UR STRIP: 1.02 (ref 1–1.03)
TEXT FOR RESPIRATORY: ABNORMAL
TROPONIN I SERPL HS-MCNC: 7 NG/L (ref 0–22)
TROPONIN I SERPL HS-MCNC: <6 NG/L (ref 0–22)
UROBILINOGEN UR STRIP-ACNC: 0.2 EU/DL (ref 0–1)
WBC #/AREA URNS HPF: <1 /HPF (ref 0–5)
WBC OTHER # BLD: 9.1 K/UL (ref 4–10.5)

## 2025-04-18 PROCEDURE — 82805 BLOOD GASES W/O2 SATURATION: CPT

## 2025-04-18 PROCEDURE — 83880 ASSAY OF NATRIURETIC PEPTIDE: CPT

## 2025-04-18 PROCEDURE — 93005 ELECTROCARDIOGRAM TRACING: CPT | Performed by: STUDENT IN AN ORGANIZED HEALTH CARE EDUCATION/TRAINING PROGRAM

## 2025-04-18 PROCEDURE — 99285 EMERGENCY DEPT VISIT HI MDM: CPT

## 2025-04-18 PROCEDURE — 2580000003 HC RX 258: Performed by: INTERNAL MEDICINE

## 2025-04-18 PROCEDURE — 83735 ASSAY OF MAGNESIUM: CPT

## 2025-04-18 PROCEDURE — 2000000000 HC ICU R&B

## 2025-04-18 PROCEDURE — 2580000003 HC RX 258: Performed by: STUDENT IN AN ORGANIZED HEALTH CARE EDUCATION/TRAINING PROGRAM

## 2025-04-18 PROCEDURE — 6370000000 HC RX 637 (ALT 250 FOR IP): Performed by: INTERNAL MEDICINE

## 2025-04-18 PROCEDURE — 84484 ASSAY OF TROPONIN QUANT: CPT

## 2025-04-18 PROCEDURE — 2500000003 HC RX 250 WO HCPCS: Performed by: STUDENT IN AN ORGANIZED HEALTH CARE EDUCATION/TRAINING PROGRAM

## 2025-04-18 PROCEDURE — 82248 BILIRUBIN DIRECT: CPT

## 2025-04-18 PROCEDURE — 83690 ASSAY OF LIPASE: CPT

## 2025-04-18 PROCEDURE — 82010 KETONE BODYS QUAN: CPT

## 2025-04-18 PROCEDURE — 36415 COLL VENOUS BLD VENIPUNCTURE: CPT

## 2025-04-18 PROCEDURE — 81001 URINALYSIS AUTO W/SCOPE: CPT

## 2025-04-18 PROCEDURE — 93010 ELECTROCARDIOGRAM REPORT: CPT | Performed by: INTERNAL MEDICINE

## 2025-04-18 PROCEDURE — 80053 COMPREHEN METABOLIC PANEL: CPT

## 2025-04-18 PROCEDURE — 82962 GLUCOSE BLOOD TEST: CPT

## 2025-04-18 PROCEDURE — 85025 COMPLETE CBC W/AUTO DIFF WBC: CPT

## 2025-04-18 PROCEDURE — 84100 ASSAY OF PHOSPHORUS: CPT

## 2025-04-18 PROCEDURE — 96360 HYDRATION IV INFUSION INIT: CPT

## 2025-04-18 PROCEDURE — 83036 HEMOGLOBIN GLYCOSYLATED A1C: CPT

## 2025-04-18 PROCEDURE — 80048 BASIC METABOLIC PNL TOTAL CA: CPT

## 2025-04-18 RX ORDER — POLYETHYLENE GLYCOL 3350 17 G/17G
17 POWDER, FOR SOLUTION ORAL DAILY PRN
Status: DISCONTINUED | OUTPATIENT
Start: 2025-04-18 | End: 2025-04-19 | Stop reason: HOSPADM

## 2025-04-18 RX ORDER — ACETAMINOPHEN 650 MG/1
650 SUPPOSITORY RECTAL EVERY 6 HOURS PRN
Status: DISCONTINUED | OUTPATIENT
Start: 2025-04-18 | End: 2025-04-19 | Stop reason: HOSPADM

## 2025-04-18 RX ORDER — INSULIN LISPRO 100 [IU]/ML
10 INJECTION, SOLUTION INTRAVENOUS; SUBCUTANEOUS
Status: DISCONTINUED | OUTPATIENT
Start: 2025-04-18 | End: 2025-04-19 | Stop reason: HOSPADM

## 2025-04-18 RX ORDER — MAGNESIUM SULFATE IN WATER 40 MG/ML
2000 INJECTION, SOLUTION INTRAVENOUS PRN
Status: DISCONTINUED | OUTPATIENT
Start: 2025-04-18 | End: 2025-04-19 | Stop reason: HOSPADM

## 2025-04-18 RX ORDER — DEXTROSE MONOHYDRATE, SODIUM CHLORIDE, AND POTASSIUM CHLORIDE 50; 1.49; 4.5 G/1000ML; G/1000ML; G/1000ML
INJECTION, SOLUTION INTRAVENOUS CONTINUOUS PRN
Status: DISCONTINUED | OUTPATIENT
Start: 2025-04-18 | End: 2025-04-19

## 2025-04-18 RX ORDER — ENOXAPARIN SODIUM 100 MG/ML
40 INJECTION SUBCUTANEOUS DAILY
Status: DISCONTINUED | OUTPATIENT
Start: 2025-04-18 | End: 2025-04-19 | Stop reason: HOSPADM

## 2025-04-18 RX ORDER — ONDANSETRON 4 MG/1
4 TABLET, ORALLY DISINTEGRATING ORAL EVERY 8 HOURS PRN
Status: DISCONTINUED | OUTPATIENT
Start: 2025-04-18 | End: 2025-04-19 | Stop reason: HOSPADM

## 2025-04-18 RX ORDER — SODIUM CHLORIDE 450 MG/100ML
INJECTION, SOLUTION INTRAVENOUS CONTINUOUS
Status: DISCONTINUED | OUTPATIENT
Start: 2025-04-18 | End: 2025-04-19

## 2025-04-18 RX ORDER — POTASSIUM CHLORIDE 7.45 MG/ML
10 INJECTION INTRAVENOUS PRN
Status: DISCONTINUED | OUTPATIENT
Start: 2025-04-18 | End: 2025-04-19 | Stop reason: HOSPADM

## 2025-04-18 RX ORDER — ACETAMINOPHEN 325 MG/1
650 TABLET ORAL EVERY 6 HOURS PRN
Status: DISCONTINUED | OUTPATIENT
Start: 2025-04-18 | End: 2025-04-19 | Stop reason: HOSPADM

## 2025-04-18 RX ORDER — POTASSIUM CHLORIDE 1500 MG/1
40 TABLET, EXTENDED RELEASE ORAL PRN
Status: DISCONTINUED | OUTPATIENT
Start: 2025-04-18 | End: 2025-04-19 | Stop reason: HOSPADM

## 2025-04-18 RX ORDER — ONDANSETRON 2 MG/ML
4 INJECTION INTRAMUSCULAR; INTRAVENOUS EVERY 6 HOURS PRN
Status: DISCONTINUED | OUTPATIENT
Start: 2025-04-18 | End: 2025-04-19 | Stop reason: HOSPADM

## 2025-04-18 RX ORDER — SODIUM CHLORIDE 0.9 % (FLUSH) 0.9 %
5-40 SYRINGE (ML) INJECTION EVERY 12 HOURS SCHEDULED
Status: DISCONTINUED | OUTPATIENT
Start: 2025-04-18 | End: 2025-04-19 | Stop reason: HOSPADM

## 2025-04-18 RX ORDER — SODIUM CHLORIDE 9 MG/ML
INJECTION, SOLUTION INTRAVENOUS PRN
Status: DISCONTINUED | OUTPATIENT
Start: 2025-04-18 | End: 2025-04-19 | Stop reason: HOSPADM

## 2025-04-18 RX ORDER — INSULIN LISPRO 100 [IU]/ML
0-8 INJECTION, SOLUTION INTRAVENOUS; SUBCUTANEOUS
Status: DISCONTINUED | OUTPATIENT
Start: 2025-04-18 | End: 2025-04-19 | Stop reason: HOSPADM

## 2025-04-18 RX ORDER — 0.9 % SODIUM CHLORIDE 0.9 %
1000 INTRAVENOUS SOLUTION INTRAVENOUS ONCE
Status: COMPLETED | OUTPATIENT
Start: 2025-04-18 | End: 2025-04-18

## 2025-04-18 RX ORDER — MAGNESIUM SULFATE IN WATER 40 MG/ML
2000 INJECTION, SOLUTION INTRAVENOUS PRN
Status: DISCONTINUED | OUTPATIENT
Start: 2025-04-18 | End: 2025-04-18 | Stop reason: SDUPTHER

## 2025-04-18 RX ORDER — SODIUM CHLORIDE, SODIUM LACTATE, POTASSIUM CHLORIDE, AND CALCIUM CHLORIDE .6; .31; .03; .02 G/100ML; G/100ML; G/100ML; G/100ML
2000 INJECTION, SOLUTION INTRAVENOUS ONCE
Status: COMPLETED | OUTPATIENT
Start: 2025-04-18 | End: 2025-04-18

## 2025-04-18 RX ORDER — SODIUM CHLORIDE 0.9 % (FLUSH) 0.9 %
5-40 SYRINGE (ML) INJECTION PRN
Status: DISCONTINUED | OUTPATIENT
Start: 2025-04-18 | End: 2025-04-19 | Stop reason: HOSPADM

## 2025-04-18 RX ORDER — SODIUM CHLORIDE 9 MG/ML
INJECTION, SOLUTION INTRAVENOUS CONTINUOUS
Status: DISCONTINUED | OUTPATIENT
Start: 2025-04-18 | End: 2025-04-19

## 2025-04-18 RX ORDER — INSULIN LISPRO 100 [IU]/ML
0-8 INJECTION, SOLUTION INTRAVENOUS; SUBCUTANEOUS
Status: DISCONTINUED | OUTPATIENT
Start: 2025-04-18 | End: 2025-04-18

## 2025-04-18 RX ORDER — INSULIN GLARGINE 100 [IU]/ML
30 INJECTION, SOLUTION SUBCUTANEOUS NIGHTLY
Status: DISCONTINUED | OUTPATIENT
Start: 2025-04-18 | End: 2025-04-19

## 2025-04-18 RX ADMIN — INSULIN LISPRO 4 UNITS: 100 INJECTION, SOLUTION INTRAVENOUS; SUBCUTANEOUS at 18:23

## 2025-04-18 RX ADMIN — SODIUM PHOSPHATE, MONOBASIC, MONOHYDRATE AND SODIUM PHOSPHATE, DIBASIC, ANHYDROUS 15 MMOL: 142; 276 INJECTION, SOLUTION INTRAVENOUS at 16:25

## 2025-04-18 RX ADMIN — INSULIN LISPRO 10 UNITS: 100 INJECTION, SOLUTION INTRAVENOUS; SUBCUTANEOUS at 18:23

## 2025-04-18 RX ADMIN — INSULIN LISPRO 6 UNITS: 100 INJECTION, SOLUTION INTRAVENOUS; SUBCUTANEOUS at 20:09

## 2025-04-18 RX ADMIN — SODIUM CHLORIDE, SODIUM LACTATE, POTASSIUM CHLORIDE, AND CALCIUM CHLORIDE 2000 ML: .6; .31; .03; .02 INJECTION, SOLUTION INTRAVENOUS at 11:23

## 2025-04-18 RX ADMIN — SODIUM CHLORIDE: 0.9 INJECTION, SOLUTION INTRAVENOUS at 20:08

## 2025-04-18 RX ADMIN — POTASSIUM CHLORIDE, DEXTROSE MONOHYDRATE AND SODIUM CHLORIDE: 150; 5; 450 INJECTION, SOLUTION INTRAVENOUS at 16:21

## 2025-04-18 RX ADMIN — SODIUM CHLORIDE 1000 ML: 9 INJECTION, SOLUTION INTRAVENOUS at 10:24

## 2025-04-18 RX ADMIN — INSULIN GLARGINE 30 UNITS: 100 INJECTION, SOLUTION SUBCUTANEOUS at 20:09

## 2025-04-18 RX ADMIN — SODIUM CHLORIDE: 4.5 INJECTION, SOLUTION INTRAVENOUS at 11:31

## 2025-04-18 NOTE — CARE COORDINATION
Patient is on the Med [facility]ist do not help list.  He has received assistance twice, most recent was 11/25/24.  He was referred to the [facility] both times but continues to be non-complaint with follow up.  He is not eligible for any further assistance.

## 2025-04-18 NOTE — PROGRESS NOTES
4 Eyes Skin Assessment     NAME:  Adrianna Landin  YOB: 1985  MEDICAL RECORD NUMBER:  [id number]    The patient is being assessed for  Admission    I agree that at least one RN has performed a thorough Head to Toe Skin Assessment on the patient. ALL assessment sites listed below have been assessed.      Areas assessed by both nurses:    Head, Face, Ears, Shoulders, Back, Chest, Arms, Elbows, Hands, Sacrum. Buttock, Coccyx, Ischium, and Legs. Feet and Heels        Does the Patient have a Wound? No noted wound(s)       Jesus Prevention initiated by RN: No  Wound Care Orders initiated by RN: No    Pressure Injury (Stage 3,4, Unstageable, DTI, NWPT, and Complex wounds) if present, place Wound referral order by RN under : No    New Ostomies, if present place, Ostomy referral order under : No     Nurse 1 eSignature: Electronically signed by Ajay Montejo RN on 4/18/25 at 3:55 PM EDT    **SHARE this note so that the co-signing nurse can place an eSignature**    Nurse 2 eSignature: Electronically signed by GLORIA ESTEVES RN on 4/19/25 at 5:11 PM EDT

## 2025-04-18 NOTE — ED PROVIDER NOTES
Emergency Department Encounter    Patient: Adrianna Landin  MRN: [id number]  : 1985  Date of Evaluation: 2025  ED Provider:  Gilbert Foster DO    Triage Chief Complaint:   Hyperglycemia (Pt endorses generalized feelings of malaise and states his BG may be high and he was recently admitted for DKA.)    [place]:  Adrianna Landin is a 40 y.o. male that presents with 2 days of generalized malaise feeling and fatigue.  States he is concerned about his blood sugar.  He reports that he was admitted due to high blood sugar in February but 1 month ago he ran out of his insulin and has been unable to get a follow-up appointment or refill since then.  Denies any fever cough nausea or vomiting chest pain shortness of breath abdominal pain.  Has had some occasional blurry vision but currently not experiencing this.  No headache    MDM:    History from : Patient    On arrival patient overall awake alert appears well but has dry mucous membranes is tachycardic mildly.  His initial blood glucose on POC was reading elevated.  On workup he has a metabolic acidosis with a pH 7.19 and bicarb of 15.  His glucose is over 700.  He does have an MITZY.  Treatment for DKA initiated.  Received initially 1 L normal saline, 2 additional LR boluses ordered along with insulin infusion.  EKG without any concerning findings.  Troponin not elevated.  He is not any altered mental status.  Will discuss with critical care for admission to the ICU for further management        ED Course as of 25 1051      0959 Reviewed outside facility discharge summary from February.  At that time patient was admitted with DKA and discharged on Lantus and short acting insulin as well [LA]   1004 EKG interpreted without cardiology.  Sinus rhythm with a rate of 119.  No ST elevation or ST depression T wave inversion lead III.  , QRS 72, QTc 489 [LA]      ED Course User Index  [LA] Gilbert Foster DO       Patient was given the following  mL IntraVENous Once [facility], .9 mL/hr at 04/18/25 1024 1,000 mL at 04/18/25 1024    insulin (HumuLIN R) 100 units in sodium chloride 0.9% 100 mL infusion  0.1-50 Units/hr IntraVENous Continuous [facility], DO        dextrose bolus 10% 125 mL  125 mL IntraVENous PRN [facility],         Or    dextrose bolus 10% 250 mL  250 mL IntraVENous PRN [facility], DO        potassium chloride 10 mEq/100 mL IVPB (Peripheral Line)  10 mEq IntraVENous PRN [facility], DO        magnesium sulfate 2000 mg in 50 mL IVPB premix  2,000 mg IntraVENous PRN [facility], DO        sodium phosphate 15 mmol in sodium chloride 0.9 % 250 mL IVPB  15 mmol IntraVENous PRN [facility], DO        0.45 % sodium chloride infusion   IntraVENous Continuous [facility],         dextrose 5 % and 0.45 % NaCl with KCl 20 mEq infusion   IntraVENous Continuous PRN [facility], DO        lactated ringers bolus 2,000 mL  2,000 mL IntraVENous Once [facility], DO         Current Outpatient Medications   Medication Sig Dispense Refill    glucose 4 g chewable tablet Take 4 tablets by mouth as needed for Low blood sugar 60 tablet 3    glucagon 1 MG SOLR injection Inject 1 mg into the skin as needed for Low blood sugar (Blood glucose LESS THAN 70 mg/dL and patient NOT ALERT or NPO and does not have IV access.) 1 each 1    insulin glargine (LANTUS SOLOSTAR) 100 UNIT/ML injection pen Inject 30 Units into the skin nightly 5 Adjustable Dose Pre-filled Pen Syringe 3    insulin lispro, 1 Unit Dial, (HUMALOG KWIKPEN) 100 UNIT/ML SOPN Inject 0-4 Units into the skin 3 times daily (before meals) 1 Adjustable Dose Pre-filled Pen Syringe 1    insulin lispro, 1 Unit Dial, (HUMALOG KWIKPEN) 100 UNIT/ML SOPN Inject 0-4 Units into the skin 3 times daily (before meals) **Corrective Low Dose Algorithm**  Glucose: Dose:   No Insulin  180-249 1 Unit  250-299 2 Units  300-349 3 Units  Over 349 4 Units and notify physician 1  U/L   Hepatic Function Panel   Result Value Ref Range    Albumin 4.8 3.4 - 5.0 g/dL    Alkaline Phosphatase 86 40 - 129 U/L    ALT 46 (H) 10 - 40 U/L    AST 22 15 - 37 U/L    Total Bilirubin 0.6 0.0 - 1.0 mg/dL    Bilirubin, Direct <0.2 0.0 - 0.3 mg/dL    Bilirubin, Indirect Can not be calculated 0.0 - 0.7 mg/dL    Total Protein 8.2 6.4 - 8.2 g/dL    Albumin/Globulin Ratio 1.4 1.1 - 2.2   Brain Natriuretic Peptide   Result Value Ref Range    NT Pro-BNP <36 0 - 125 pg/mL   Beta-Hydroxybutyrate   Result Value Ref Range    Beta-Hydroxybutyrate >8.00 (H) 0.00 - 0.27 mmol/L   Blood Gas, Venous   Result Value Ref Range    pH, Saul 7.197 (L) 7.320 - 7.430    pCO2, Saul 37.3 (L) 38 - 54 mm Hg    PO2, Saul 46.9 23 - 48 mm Hg    HCO3, Venous 14.2 (L) 22 - 29 mmol/L    Negative Base Excess, Saul 13.0 (H) 0 - 3 mmol/L    Oxyhemoglobin 76.7 %    Carboxyhemoglobin 0.5 0.5 - 1.5 %    Methemoglobin 0.4 (L) 0.5 - 1.5 %    Pt Temp 37.0     O2 Device/Flow/% ROOM AIR     Micheal Test NOT APPLICABLE     Text for Respiratory Called to PROVIDER on 04/18/2025 at 10:34    Microscopic Urinalysis   Result Value Ref Range    WBC, UA PENDING /HPF    RBC, UA PENDING /HPF    Casts UA PENDING /LPF    Epithelial Cells, UA PENDING /HPF    Renal Epithelial, UA PENDING 0 /HPF    Bacteria, UA PENDING None    Mucus, UA PENDING None    Trichomonas PENDING None    Amorphous, UA PENDING None    Yeast, UA PENDING None    Other Observations UA PENDING NOT REQ.    Sperm, Urine PENDING None seen   POCT Glucose   Result Value Ref Range    Glucose  mg/dL    QC OK? ok    EKG 12 Lead   Result Value Ref Range    Ventricular Rate 119 BPM    Atrial Rate 119 BPM    P-R Interval 136 ms    QRS Duration 72 ms    Q-T Interval 348 ms    QTc Calculation (Bazett) 489 ms    P Axis 49 degrees    R Axis 66 degrees    T Axis 32 degrees    Diagnosis       Sinus tachycardia  Otherwise normal ECG  When compared with ECG of 11-MAR-2024 17:37,  Vent. rate has increased BY  46 BPM

## 2025-04-18 NOTE — ED NOTES
[id number] message left for Dr Cutler on in patient consult from critical care. Added to treatment team.

## 2025-04-18 NOTE — ED NOTES
Medication History  [facility]    Patient Name: Adrianna Landin 1985     Medication history has been completed by: Melvina Lozano CPhT    Source(s) of information: patient with  services     Primary Care Physician: No primary care provider on file.     Pharmacy: [facility]    Allergies as of 04/18/2025    (No Known Allergies)        Prior to Admission medications    Medication Sig Start Date End Date Taking? Authorizing Provider   insulin glargine (LANTUS SOLOSTAR) 100 UNIT/ML injection pen Inject 30 Units into the skin nightly 11/22/24  Yes Jorge Sunshine MD   glucose 4 g chewable tablet Take 4 tablets by mouth as needed for Low blood sugar 11/24/24   Jorge Sunshine MD   glucagon 1 MG SOLR injection Inject 1 mg into the skin as needed for Low blood sugar (Blood glucose LESS THAN 70 mg/dL and patient NOT ALERT or NPO and does not have IV access.) 11/24/24   Jorge Sunshine MD   insulin lispro, 1 Unit Dial, (HUMALOG KWIKPEN) 100 UNIT/ML SOPN Inject 0-4 Units into the skin 3 times daily (before meals) 11/22/24   Jorge Sunshine MD   insulin lispro, 1 Unit Dial, (HUMALOG KWIKPEN) 100 UNIT/ML SOPN Inject 0-4 Units into the skin 3 times daily (before meals) **Corrective Low Dose Algorithm**  Glucose: Dose:   No Insulin  180-249 1 Unit  250-299 2 Units  300-349 3 Units  Over 349 4 Units and notify physician 11/22/24   Jorge Sunshine MD   glucose monitoring kit 1 kit by Does not apply route daily 11/22/24   Jorge Sunshine MD   blood glucose monitor strips Test 4 times a day & as needed for symptoms of irregular blood glucose. Dispense sufficient amount for indicated testing frequency plus additional to accommodate PRN testing needs. 11/22/24   Jorge Sunshine MD   Lancets MISC 1 each by Does not apply route daily 11/22/24   Jorge Sunshine MD   insulin lispro, 1 Unit Dial, (HUMALOG KWIKPEN) 100 UNIT/ML SOPN Inject 10 Units into the skin 3 times daily (before meals) 6/10/24 7/10/24  Tong Watkins,

## 2025-04-18 NOTE — H&P
History and Physical 25        NAME: Adrianna Landin  : 1985  MRN: [id number]      Assessment/Plan:  Adrianna Landin is a 40 y.o. male     DKA (mild), type 2 diabetes mellitus, not at goal  Electrolyte dyscrasias secondary to above (pseudohyponatremia, hyperkalemia  Acute kidney injury due to hypovolemia (acute tubular necrosis  Hyperlipidemia per history      IV fluids, IV insulin.  When gap closes, transition to subcutaneous insulin.  Monitor renal function, electrolytes, urine output    Consultations to endocrinology, Hospital medicine service.  When free of insulin infusion, transfer out of ICU setting    Ulcer DVT prophylaxis    [facility]  [phone]      Chief Complaint:    As above    History of Present Illness:    Weakness, fatigue, polyuria.  Reportedly free of insulin (unable to afford) for the past month.  I note that the patient was hospitalized managed through [facility] in February of this year with uncontrolled type 2 diabetes mellitus and DKA.    ROS:    Review of Systems     Language barrier, admits to polyuria polydipsia fatigue    Past Medical, Surgical, Social, Family History:   No past medical history on file.  No past surgical history on file.  Social History     Socioeconomic History    Marital status: Single     Spouse name: Not on file    Number of children: Not on file    Years of education: Not on file    Highest education level: Not on file   Occupational History    Not on file   Tobacco Use    Smoking status: Never    Smokeless tobacco: Never   Vaping Use    Vaping status: Never Used   Substance and Sexual Activity    Alcohol use: Not on file    Drug use: Not on file    Sexual activity: Defer     Partners: Female     Birth control/protection: Abstinence   Other Topics Concern    Not on file   Social History Narrative    Not on file     Social Drivers of Health     Financial Resource Strain: High Risk (2025)    Received from [facility]

## 2025-04-18 NOTE — CONSULTS
Endocrinology   Consult Note  4/18/2025  9:34 AM     Primary Care provider: No primary care provider on file.     Referring physician:  Stevie Etienne Jr., DO     Dear Doctor Jaimie    Thank You for the Consult     Pt. Was Admitted for : Increasing fatigue polyuria nausea and hyperglycemia with DKA    Reason for Consult: Better control of blood glucose      History Obtained From:  Patient/ EMR       HISTORY OF PRESENT ILLNESS:                The patient is a 40 y.o. male with significant past medical history of type 2 diabetes mellitus came in complaining of of polyuria nausea abdominal discomfort was found to be hyperglycemic and mild DKA.  Patient was started on IV insulin infusion drip and admitted to ICU      I was  consulted for better control of blood glucose.       ROS:   Pt's ROS done in detail.  Abnormal ROS are noted in Medical and Surgical History Section below:     Other Medical History:    No past medical history on file.  Surgical History:    No past surgical history on file.    Allergies:  Patient has no known allergies.    Family History:   No family history on file.  REVIEW OF SYSTEMS:  Review of System Done as noted above     PHYSICAL EXAM:      Vitals:    /82   Pulse 95   Temp 97.6 °F (36.4 °C) (Oral)   Resp 12   Wt 69.3 kg (152 lb 12.8 oz)   SpO2 98%   BMI 26.23 kg/m²     CONSTITUTIONAL:  awake, alert, cooperative, appears stated age   EYES:  vision intact Fundoscopic Exam not performed   ENT:Normal  NECK:  Supple, No JVD.   Thyroid Exam:Normal   LUNGS:  Has Vesicular Breath Sounds,   CARDIOVASCULAR:  Normal apical impulse, regular rate and rhythm, normal S1 and S2, no S3 or S4, and has no  murmur   ABDOMEN:  No scars, normal bowel sounds, soft, non-distended, non-tender, no masses palpated, no hepatolienomegaly  Musculoskeletal: Normal  Extremities: Normal, peripheral pulses normal, , has no edema   NEUROLOGIC:  Awake, alert, oriented to name, place and time.  Cranial nerves

## 2025-04-19 VITALS
BODY MASS INDEX: 26.23 KG/M2 | SYSTOLIC BLOOD PRESSURE: 110 MMHG | HEART RATE: 94 BPM | WEIGHT: 152.8 LBS | DIASTOLIC BLOOD PRESSURE: 69 MMHG | TEMPERATURE: 98 F | RESPIRATION RATE: 22 BRPM | OXYGEN SATURATION: 97 %

## 2025-04-19 LAB
ANION GAP SERPL CALCULATED.3IONS-SCNC: 10 MMOL/L (ref 9–17)
ANION GAP SERPL CALCULATED.3IONS-SCNC: 11 MMOL/L (ref 9–17)
BUN SERPL-MCNC: 12 MG/DL (ref 7–20)
BUN SERPL-MCNC: 12 MG/DL (ref 7–20)
CALCIUM SERPL-MCNC: 7.9 MG/DL (ref 8.3–10.6)
CALCIUM SERPL-MCNC: 8.2 MG/DL (ref 8.3–10.6)
CHLORIDE SERPL-SCNC: 103 MMOL/L (ref 99–110)
CHLORIDE SERPL-SCNC: 104 MMOL/L (ref 99–110)
CO2 SERPL-SCNC: 19 MMOL/L (ref 21–32)
CO2 SERPL-SCNC: 20 MMOL/L (ref 21–32)
CREAT SERPL-MCNC: 0.9 MG/DL (ref 0.9–1.3)
CREAT SERPL-MCNC: 1 MG/DL (ref 0.9–1.3)
ERYTHROCYTE [DISTWIDTH] IN BLOOD BY AUTOMATED COUNT: 13.9 % (ref 11.7–14.9)
GFR, ESTIMATED: 87 ML/MIN/1.73M2
GFR, ESTIMATED: >90 ML/MIN/1.73M2
GLUCOSE BLD-MCNC: 248 MG/DL (ref 74–99)
GLUCOSE BLD-MCNC: 275 MG/DL (ref 74–99)
GLUCOSE SERPL-MCNC: 173 MG/DL (ref 74–99)
GLUCOSE SERPL-MCNC: 229 MG/DL (ref 74–99)
HCT VFR BLD AUTO: 39.4 % (ref 42–52)
HGB BLD-MCNC: 14 G/DL (ref 13.5–18)
MAGNESIUM SERPL-MCNC: 1.6 MG/DL (ref 1.8–2.4)
MAGNESIUM SERPL-MCNC: 1.7 MG/DL (ref 1.8–2.4)
MCH RBC QN AUTO: 28.7 PG (ref 27–31)
MCHC RBC AUTO-ENTMCNC: 35.5 G/DL (ref 32–36)
MCV RBC AUTO: 80.7 FL (ref 78–100)
PHOSPHATE SERPL-MCNC: 2.5 MG/DL (ref 2.5–4.9)
PHOSPHATE SERPL-MCNC: 2.6 MG/DL (ref 2.5–4.9)
PLATELET # BLD AUTO: 262 K/UL (ref 140–440)
PMV BLD AUTO: 10.4 FL (ref 7.5–11.1)
POTASSIUM SERPL-SCNC: 3.4 MMOL/L (ref 3.5–5.1)
POTASSIUM SERPL-SCNC: 3.7 MMOL/L (ref 3.5–5.1)
RBC # BLD AUTO: 4.88 M/UL (ref 4.6–6.2)
SODIUM SERPL-SCNC: 133 MMOL/L (ref 136–145)
SODIUM SERPL-SCNC: 134 MMOL/L (ref 136–145)
WBC OTHER # BLD: 6.8 K/UL (ref 4–10.5)

## 2025-04-19 PROCEDURE — 80048 BASIC METABOLIC PNL TOTAL CA: CPT

## 2025-04-19 PROCEDURE — 6370000000 HC RX 637 (ALT 250 FOR IP): Performed by: INTERNAL MEDICINE

## 2025-04-19 PROCEDURE — 85027 COMPLETE CBC AUTOMATED: CPT

## 2025-04-19 PROCEDURE — 83735 ASSAY OF MAGNESIUM: CPT

## 2025-04-19 PROCEDURE — 6360000002 HC RX W HCPCS: Performed by: NURSE PRACTITIONER

## 2025-04-19 PROCEDURE — 2580000003 HC RX 258: Performed by: INTERNAL MEDICINE

## 2025-04-19 PROCEDURE — 36415 COLL VENOUS BLD VENIPUNCTURE: CPT

## 2025-04-19 PROCEDURE — 82962 GLUCOSE BLOOD TEST: CPT

## 2025-04-19 PROCEDURE — 84100 ASSAY OF PHOSPHORUS: CPT

## 2025-04-19 PROCEDURE — 2500000003 HC RX 250 WO HCPCS: Performed by: NURSE PRACTITIONER

## 2025-04-19 PROCEDURE — 94761 N-INVAS EAR/PLS OXIMETRY MLT: CPT

## 2025-04-19 PROCEDURE — 6360000002 HC RX W HCPCS: Performed by: INTERNAL MEDICINE

## 2025-04-19 RX ORDER — SODIUM CHLORIDE, SODIUM LACTATE, POTASSIUM CHLORIDE, AND CALCIUM CHLORIDE .6; .31; .03; .02 G/100ML; G/100ML; G/100ML; G/100ML
500 INJECTION, SOLUTION INTRAVENOUS ONCE
Status: COMPLETED | OUTPATIENT
Start: 2025-04-19 | End: 2025-04-19

## 2025-04-19 RX ORDER — INSULIN LISPRO 100 [IU]/ML
10 INJECTION, SOLUTION INTRAVENOUS; SUBCUTANEOUS
Qty: 9 ML | Refills: 0 | Status: ON HOLD | OUTPATIENT
Start: 2025-04-19 | End: 2025-04-29 | Stop reason: HOSPADM

## 2025-04-19 RX ORDER — INSULIN LISPRO 100 [IU]/ML
0-4 INJECTION, SOLUTION INTRAVENOUS; SUBCUTANEOUS
Qty: 1 ADJUSTABLE DOSE PRE-FILLED PEN SYRINGE | Refills: 0 | Status: ON HOLD | OUTPATIENT
Start: 2025-04-19 | End: 2025-04-29 | Stop reason: HOSPADM

## 2025-04-19 RX ORDER — MAGNESIUM SULFATE IN WATER 40 MG/ML
2000 INJECTION, SOLUTION INTRAVENOUS ONCE
Status: COMPLETED | OUTPATIENT
Start: 2025-04-19 | End: 2025-04-19

## 2025-04-19 RX ORDER — ATORVASTATIN CALCIUM 20 MG/1
20 TABLET, FILM COATED ORAL DAILY
Qty: 30 TABLET | Refills: 0 | Status: SHIPPED | OUTPATIENT
Start: 2025-04-19

## 2025-04-19 RX ORDER — INSULIN GLARGINE 100 [IU]/ML
30 INJECTION, SOLUTION SUBCUTANEOUS NIGHTLY
Qty: 5 ADJUSTABLE DOSE PRE-FILLED PEN SYRINGE | Refills: 0 | Status: ON HOLD | OUTPATIENT
Start: 2025-04-19 | End: 2025-04-29 | Stop reason: HOSPADM

## 2025-04-19 RX ORDER — INSULIN GLARGINE 100 [IU]/ML
35 INJECTION, SOLUTION SUBCUTANEOUS NIGHTLY
Status: DISCONTINUED | OUTPATIENT
Start: 2025-04-19 | End: 2025-04-19 | Stop reason: HOSPADM

## 2025-04-19 RX ADMIN — MAGNESIUM SULFATE HEPTAHYDRATE 2000 MG: 40 INJECTION, SOLUTION INTRAVENOUS at 10:17

## 2025-04-19 RX ADMIN — POTASSIUM BICARBONATE 40 MEQ: 782 TABLET, EFFERVESCENT ORAL at 10:10

## 2025-04-19 RX ADMIN — INSULIN LISPRO 4 UNITS: 100 INJECTION, SOLUTION INTRAVENOUS; SUBCUTANEOUS at 08:34

## 2025-04-19 RX ADMIN — ENOXAPARIN SODIUM 40 MG: 100 INJECTION SUBCUTANEOUS at 08:33

## 2025-04-19 RX ADMIN — INSULIN LISPRO 10 UNITS: 100 INJECTION, SOLUTION INTRAVENOUS; SUBCUTANEOUS at 08:35

## 2025-04-19 RX ADMIN — INSULIN LISPRO 2 UNITS: 100 INJECTION, SOLUTION INTRAVENOUS; SUBCUTANEOUS at 13:10

## 2025-04-19 RX ADMIN — INSULIN LISPRO 10 UNITS: 100 INJECTION, SOLUTION INTRAVENOUS; SUBCUTANEOUS at 13:10

## 2025-04-19 RX ADMIN — SODIUM CHLORIDE, PRESERVATIVE FREE 10 ML: 5 INJECTION INTRAVENOUS at 08:34

## 2025-04-19 RX ADMIN — SODIUM CHLORIDE, SODIUM LACTATE, POTASSIUM CHLORIDE, AND CALCIUM CHLORIDE 500 ML: .6; .31; .03; .02 INJECTION, SOLUTION INTRAVENOUS at 10:21

## 2025-04-19 NOTE — PROGRESS NOTES
Transitioned off Insulin infusion, transfer out of ICU setting, transition care to hospital medicine service.    E [facility]  [phone]

## 2025-04-19 NOTE — PROGRESS NOTES
Inpatient Critical Care Progress Note 4/19/2025        Adrianna Landin  1985  [id number]      Assessment/Plan:    Adrianna Landin is a 40 y.o. male      DKA (mild), type 2 diabetes mellitus, not at goal  Electrolyte dyscrasias secondary to above (pseudohyponatremia, hyperkalemia  Acute kidney injury due to hypovolemia (acute tubular necrosis  Hyperlipidemia per history        Successfully transitioned off insulin infusion.      Transfer out of ICU setting transition care to hospital medicine service.       Ulcer DVT prophylaxis     E [facility]  [phone]         Subjective:    Patient notes general overall improvement in his sense of wellbeing.    Successfully transitioned off insulin infusion yesterday.    No acute overnight events reported.      Review of Systems     Although patient gestures overall improvement, limited English hence review of systems unable to fully obtain.    Physical Exam:         /84   Pulse 81   Temp 98 °F (36.7 °C) (Oral)   Resp 16   Wt 69.3 kg (152 lb 12.8 oz)   SpO2 97%   BMI 26.23 kg/m²       General: Appears in no acute distress awake and alert vitals reviewed  Eyes: Pupils motor intact  ENT: Head normal.  Hearing intact.  Dry oral mucosa neck supple  Cardiovascular: Regular rate.  Respiratory: Clear to auscultation  Gastrointestinal: Soft, non tender  Genitourinary: no suprapubic tenderness  Musculoskeletal: No edema  Skin: warm, dry  Neuro: Alert, appropriately interactive.  Psych: Mood appropriate.         Current Medications:   insulin glargine  35 Units SubCUTAneous Nightly    potassium alternative oral replacement  40 mEq Oral Once    magnesium sulfate  2,000 mg IntraVENous Once    lactated ringers  500 mL IntraVENous Once    sodium chloride flush  5-40 mL IntraVENous 2 times per day    enoxaparin  40 mg SubCUTAneous Daily    insulin lispro  10 Units SubCUTAneous TID WC    insulin lispro  0-8 Units SubCUTAneous 4x Daily AC & HS       Labs, Imaging and Studies

## 2025-04-19 NOTE — DISCHARGE SUMMARY
Please use this note as a progress note for the day if the patient does not discharge today      Discharge Summary    Name:  Adrianna Landin /Age/Sex: 1985  (40 y.o. male)   MRN & CSN:  [id number] & [id number] Admission Date/Time: 2025  9:34 AM   Attending:  Etienne Mora MD Discharging Physician: Etienne Mora MD       Admission Diagnosis:   DKA (mild), type 2 diabetes mellitus, not at goal  Pseudohyponatremia, hyperkalemia  Acute kidney injury   Hyperlipidemia per history    Discharge Diagnosis, hospital course, assessment and plan:  Adrianna Landin is a 40 y.o.  male  who presents with DKA, type 1, not at goal (HCC)    Patient admitted on 2025  9:34 AM    Per H+P:    Weakness, fatigue, polyuria. Reportedly free of insulin (unable to afford) for the past month. I note that the patient was hospitalized managed through [facility] in February of this year with uncontrolled type 2 diabetes mellitus and DKA.     Assessment  #. DKA - Resolved   - Presented with sugar 746  - pH 7.197 with HCO3 14/2 and betahydroxybutyrate >8  - S/p Insulin drip   - On lantus/humalog      #. MITZY   - Presented with Cr 1.5  - Now down to 1.0 and stable      #. Hyperkalemia, Hypomagnesemia  - Initial K 5.4   - K 3.7 and Mg 1.6 today      #. Type II DM on Insulin   - A1c 16.2     #. Medication Non-Compliance   - Has been out of insulin for >1 month     #. H/O HLD   - On statin         Plan  Continue lantus and humalog   Give 1L LR bolus   Replace IV MgSO4   Stable enough to be discharged home   Patient needs to f/u with [facility] as pt. Is on the do not help list       The patient expressed appropriate understanding of and agreement with the discharge recommendations, medications, and plan.     Consults this admission:  IP CONSULT TO ENDOCRINOLOGY  IP CONSULT TO HOSPITALIST        Discharge Exam  /69   Pulse 94   Temp 98 °F (36.7 °C) (Oral)   Resp 22   Wt 69.3 kg (152 lb 12.8 oz)   SpO2  care physician: Discharge instructions as given to patient:   Be compliant with medications  Please take medications as prescribed   Please take insulin as prescribed     Diet: ADULT DIET; Regular; 4 carb choices (60 gm/meal)  Activity: activity as tolerated  Disposition: Discharged to:   [x]Home, []C, []SNF, []Acute Rehab, []Hospice   Condition on discharge: Stable    Discharge Medications:        Medication List        CHANGE how you take these medications      atorvastatin 20 MG tablet  Commonly known as: LIPITOR  Take 1 tablet by mouth daily  What changed:   how much to take  how to take this  when to take this     * insulin lispro (1 Unit Dial) 100 UNIT/ML Sopn  Commonly known as: HumaLOG KwikPen  Inject 10 Units into the skin 3 times daily (before meals)  What changed:   Another medication with the same name was changed. Make sure you understand how and when to take each.  Another medication with the same name was removed. Continue taking this medication, and follow the directions you see here.     * insulin lispro (1 Unit Dial) 100 UNIT/ML Sopn  Commonly known as: HumaLOG KwikPen  Inject 0-4 Units into the skin 3 times daily (before meals) **Corrective Low Dose Algorithm** Glucose: Dose: : No Insulin. 180-249: 1 Uni.t 250-299: 2 Units. 300-349: 3 Units. Over 349: 4 Units and notify physician  What changed:   additional instructions  Another medication with the same name was removed. Continue taking this medication, and follow the directions you see here.           * This list has 2 medication(s) that are the same as other medications prescribed for you. Read the directions carefully, and ask your doctor or other care provider to review them with you.                CONTINUE taking these medications      blood glucose test strips  Test 4 times a day & as needed for symptoms of irregular blood glucose. Dispense sufficient amount for indicated testing frequency plus additional to accommodate PRN testing

## 2025-04-19 NOTE — CONSULTS
Consult Note    Patient:  Adrianna Landin 40 y.o. male MRN: [id number]     Date of Service: 4/19/2025    Hospital Day: 2      Reason for Consult: DKA      Assessment and Plan   Adrianna Landin, a 40 y.o. male, with a history of Type II DM on insulin, HLD was admitted on 4/18/2025 with complaints of had concerns including Hyperglycemia (Pt endorses generalized feelings of malaise and states his BG may be high and he was recently admitted for DKA.).    Assessment  #. DKA - Resolved   - Presented with sugar 746  - pH 7.197 with HCO3 14/2 and betahydroxybutyrate >8  - S/p Insulin drip   - On lantus/humalog     #. MITZY   - Presented with Cr 1.5  - Now down to 1.0 and stable     #. Hyperkalemia, Hypokalemia   - Initial K 5.4   - K 3.7 and Mg 1.6 today     #. Type II DM on Insulin   - A1c 16.2    #. Medication Non-Compliance   - Has been out of insulin for >1 month    #. H/O HLD   - On statin       Plan  Continue lantus and humalog   Give 1L LR bolus   Replace IV MgSO4   Stable enough to be discharged home       # Peptic ulcer prophylaxis: -   # DVT Prophylaxis: Lovenox  #CODE STATUS: FULL   Miguel, Father, to help with the decision making     Thank you, Dr. Etienne, for allowing us to participate in the care of your patient: Adrianna Landin. For any questions, please call the number of the covering hospitalist listed under the treatment team in care connect.    Current living situation: Home   Expected Disposition: Home   Estimated discharge date: Likely today     Personally reviewed Lab Studies and Imaging     Discussed management of the case with ICU team - ok to transfer out of the ICU.     EKG with sinus tach       Drugs that require monitoring for toxicity include lantus and humalog and the method of monitoring was POCT glucose. Watch for hypoglycemia.       Drugs that require monitoring for toxicity include lovenox and the method of monitoring was CBC    History of Present Illness   Adrianna Landin, a 40 y.o. male,

## 2025-04-19 NOTE — PROGRESS NOTES
Progress Note( Dr. Cutler)  4/19/2025  Subjective:   Admit Date: 4/18/2025  PCP: No primary care provider on file.    Admitted For : Increasing fatigue polyuria nausea and hyperglycemia with DKA     Consulted For: Better control of blood glucose     Interval History: Patient feels better blood glucose were running higher this morning    Denies any chest pains,   Denies SOB .   Denies nausea or vomiting.   No new bowel or bladder symptoms.       Intake/Output Summary (Last 24 hours) at 4/19/2025 1237  Last data filed at 4/19/2025 0600  Gross per 24 hour   Intake --   Output 1550 ml   Net -1550 ml       DATA    CBC:   Recent Labs     04/18/25  1000 04/19/25  1005   WBC 9.1 6.8   HGB 17.4 14.0    262    CMP:  Recent Labs     04/18/25  1000 04/18/25  1510 04/19/25  0000 04/19/25  0600   * 135* 133* 134*   K 5.4* 3.8 3.4* 3.7   CL 89* 101 104 103   CO2 15* 19* 19* 20*   BUN 17 11 12 12   CREATININE 1.5* 1.0 0.9 1.0   CALCIUM 11.4* 8.9 8.2* 7.9*   BILITOT 0.6  --   --   --    ALKPHOS 86  --   --   --    AST 22  --   --   --    ALT 46*  --   --   --      Lipids: No results found for: \"CHOL\", \"HDL\", \"TRIG\"  Glucose:  Recent Labs     04/18/25  1954 04/18/25  2323 04/19/25  0810   POCGLU 342* 178* 275*     [id number]:  Lab Results   Component Value Date/Time    LABA1C 16.2 04/18/2025 10:45 AM     High Sensitivity TSH: No results found for: \"TSHHS\"  Free T3: No results found for: \"FT3\"  Free T4:No results found for: \"T4FREE\"    No orders to display        Scheduled Medicines   Medications:    insulin glargine  35 Units SubCUTAneous Nightly    sodium chloride flush  5-40 mL IntraVENous 2 times per day    enoxaparin  40 mg SubCUTAneous Daily    insulin lispro  10 Units SubCUTAneous TID WC    insulin lispro  0-8 Units SubCUTAneous 4x Daily AC & HS      Infusions:    sodium chloride           Objective:   Vitals: /69   Pulse 94   Temp 98 °F (36.7 °C) (Oral)   Resp 22   Wt 69.3 kg (152 lb 12.8 oz)

## 2025-04-19 NOTE — DISCHARGE INSTRUCTIONS
Internal Medicine Discharge Instruction    Discharge to:  Home  Diet: Diabetic diet   Activity: As tolerated       Be compliant with medications  Please take medications as prescribed   Please take insulin as prescribed         Electronically signed by Etienne Mora MD on 4/19/2025 at 8:56 AM

## 2025-04-19 NOTE — PROGRESS NOTES
Patient discharged at 1325. Instructions given on where to  meds. No questions at this time. Belongings gathered and wheeled to the exit. IV's removed.

## 2025-04-21 LAB
GLUCOSE BLD-MCNC: >600 MG/DL (ref 74–99)
GLUCOSE BLD-MCNC: >600 MG/DL (ref 74–99)

## 2025-04-25 NOTE — PROGRESS NOTES
Physician Progress Note      PATIENT:               ESTELITA LUU  [facility] #:                  [id number]  :                       1985  ADMIT DATE:       2025 9:34 AM  DISCH DATE:        2025 1:25 PM  RESPONDING  PROVIDER #:        Stevie Etienne DO          QUERY TEXT:    Acute tubular necrosis is documented in the medical record  and    critical care note. Please provide additional clinical indicators supportive   of your documentation. Or please document if the diagnosis of ATN has been   ruled out after study.    The clinical indicators include:  41 yo male admitted with DKA  - H&P \"Acute kidney injury due to hypovolemia (acute tubular necrosis\"    - SCr 1.5-0.9, GFR 53->90  - UA  0 to 2 hyaline casts    - 2L LR bolus, 0.45%  ml/hr then 0.9% 100 ml/hr, BMP, Insulin gtt.  Options provided:  -- ATN present as evidenced by, Please document evidence.  -- This patient has MITZY without ATN.  -- Other - I will add my own diagnosis  -- Disagree - Not applicable / Not valid  -- Disagree - Clinically unable to determine / Unknown  -- Refer to Clinical Documentation Reviewer    PROVIDER RESPONSE TEXT:    This patient has MITZY without ATN.    Query created by: Jess Blanchard on 2025 7:38 AM      Electronically signed by:  Stevie Etienne DO 2025 6:29 AM

## 2025-04-26 ENCOUNTER — HOSPITAL ENCOUNTER (INPATIENT)
Age: 40
LOS: 3 days | Discharge: HOME | DRG: 638 | End: 2025-04-29
Attending: STUDENT IN AN ORGANIZED HEALTH CARE EDUCATION/TRAINING PROGRAM | Admitting: STUDENT IN AN ORGANIZED HEALTH CARE EDUCATION/TRAINING PROGRAM
Payer: MEDICAID

## 2025-04-26 ENCOUNTER — APPOINTMENT (OUTPATIENT)
Dept: GENERAL RADIOLOGY | Age: 40
DRG: 638 | End: 2025-04-26
Payer: MEDICAID

## 2025-04-26 DIAGNOSIS — E87.5 HYPERKALEMIA: ICD-10-CM

## 2025-04-26 DIAGNOSIS — E10.10 DIABETIC KETOACIDOSIS WITHOUT COMA ASSOCIATED WITH TYPE 1 DIABETES MELLITUS (HCC): Primary | ICD-10-CM

## 2025-04-26 LAB
ALBUMIN SERPL-MCNC: 4.6 G/DL (ref 3.4–5)
ALBUMIN/GLOB SERPL: 1.2 {RATIO} (ref 1.1–2.2)
ALP SERPL-CCNC: 79 U/L (ref 40–129)
ALT SERPL-CCNC: 24 U/L (ref 10–40)
ANION GAP SERPL CALCULATED.3IONS-SCNC: 24 MMOL/L (ref 9–17)
ARTERIAL PATENCY WRIST A: ABNORMAL
AST SERPL-CCNC: 16 U/L (ref 15–37)
B-OH-BUTYR SERPL-MCNC: >8 MMOL/L (ref 0–0.27)
BASOPHILS # BLD: 0.04 K/UL
BASOPHILS NFR BLD: 1 % (ref 0–1)
BILIRUB SERPL-MCNC: 0.3 MG/DL (ref 0–1)
BILIRUB UR QL STRIP: NEGATIVE
BODY TEMPERATURE: 37
BUN SERPL-MCNC: 12 MG/DL (ref 7–20)
CALCIUM SERPL-MCNC: 9.3 MG/DL (ref 8.3–10.6)
CHARACTER UR: ABNORMAL
CHLORIDE SERPL-SCNC: 92 MMOL/L (ref 99–110)
CLARITY UR: CLEAR
CO2 SERPL-SCNC: 9 MMOL/L (ref 21–32)
COHGB MFR BLD: 0.4 % (ref 0.5–1.5)
COLOR UR: YELLOW
CREAT SERPL-MCNC: 1.3 MG/DL (ref 0.9–1.3)
EOSINOPHIL # BLD: 0.01 K/UL
EOSINOPHILS RELATIVE PERCENT: 0 % (ref 0–3)
ERYTHROCYTE [DISTWIDTH] IN BLOOD BY AUTOMATED COUNT: 14.6 % (ref 11.7–14.9)
EST. AVERAGE GLUCOSE BLD GHB EST-MCNC: 390 MG/DL
GFR, ESTIMATED: 60 ML/MIN/1.73M2
GLUCOSE BLD-MCNC: 397 MG/DL (ref 74–99)
GLUCOSE BLD-MCNC: 403 MG/DL (ref 74–99)
GLUCOSE BLD-MCNC: 452 MG/DL (ref 74–99)
GLUCOSE SERPL-MCNC: 654 MG/DL (ref 74–99)
GLUCOSE UR STRIP-MCNC: 500 MG/DL
HBA1C MFR BLD: 15.2 % (ref 4.2–6.3)
HCO3 VENOUS: 10.7 MMOL/L (ref 22–29)
HCT VFR BLD AUTO: 50.8 % (ref 42–52)
HGB BLD-MCNC: 16.8 G/DL (ref 13.5–18)
HGB UR QL STRIP.AUTO: ABNORMAL
IMM GRANULOCYTES # BLD AUTO: 0.02 K/UL
IMM GRANULOCYTES NFR BLD: 0 %
KETONES UR STRIP-MCNC: >80 MG/DL
LEUKOCYTE ESTERASE UR QL STRIP: NEGATIVE
LIPASE SERPL-CCNC: 41 U/L (ref 13–60)
LYMPHOCYTES NFR BLD: 2.21 K/UL
LYMPHOCYTES RELATIVE PERCENT: 32 % (ref 24–44)
MAGNESIUM SERPL-MCNC: 2.3 MG/DL (ref 1.8–2.4)
MCH RBC QN AUTO: 27.8 PG (ref 27–31)
MCHC RBC AUTO-ENTMCNC: 33.1 G/DL (ref 32–36)
MCV RBC AUTO: 84 FL (ref 78–100)
METHEMOGLOBIN: 0.2 % (ref 0.5–1.5)
MONOCYTES NFR BLD: 0.51 K/UL
MONOCYTES NFR BLD: 8 % (ref 0–5)
MUCOUS THREADS URNS QL MICRO: ABNORMAL
NEGATIVE BASE EXCESS, VEN: 17.5 MMOL/L (ref 0–3)
NEUTROPHILS NFR BLD: 59 % (ref 36–66)
NEUTS SEG NFR BLD: 4.05 K/UL
NITRITE UR QL STRIP: NEGATIVE
OXYHGB MFR BLD: 32.1 %
PCO2 VENOUS: 33.9 MM HG (ref 38–54)
PH UR STRIP: 5.5 [PH] (ref 5–8)
PH VENOUS: 7.12 (ref 7.32–7.43)
PLATELET # BLD AUTO: 468 K/UL (ref 140–440)
PMV BLD AUTO: 11.1 FL (ref 7.5–11.1)
PO2 VENOUS: 19.7 MM HG (ref 23–48)
POTASSIUM SERPL-SCNC: 3.6 MMOL/L (ref 3.5–5.1)
POTASSIUM SERPL-SCNC: 6.3 MMOL/L (ref 3.5–5.1)
PROT SERPL-MCNC: 8.3 G/DL (ref 6.4–8.2)
PROT UR STRIP-MCNC: ABNORMAL MG/DL
RBC # BLD AUTO: 6.05 M/UL (ref 4.6–6.2)
RBC #/AREA URNS HPF: 0 /HPF (ref 0–2)
SODIUM SERPL-SCNC: 126 MMOL/L (ref 136–145)
SP GR UR STRIP: 1.02 (ref 1–1.03)
TEXT FOR RESPIRATORY: ABNORMAL
TROPONIN I SERPL HS-MCNC: <6 NG/L (ref 0–22)
TROPONIN I SERPL HS-MCNC: <6 NG/L (ref 0–22)
UROBILINOGEN UR STRIP-ACNC: 0.2 EU/DL (ref 0–1)
WBC #/AREA URNS HPF: 0 /HPF (ref 0–5)
WBC OTHER # BLD: 6.8 K/UL (ref 4–10.5)

## 2025-04-26 PROCEDURE — 94640 AIRWAY INHALATION TREATMENT: CPT

## 2025-04-26 PROCEDURE — 2580000003 HC RX 258

## 2025-04-26 PROCEDURE — 6360000002 HC RX W HCPCS: Performed by: STUDENT IN AN ORGANIZED HEALTH CARE EDUCATION/TRAINING PROGRAM

## 2025-04-26 PROCEDURE — 84484 ASSAY OF TROPONIN QUANT: CPT

## 2025-04-26 PROCEDURE — 2000000000 HC ICU R&B

## 2025-04-26 PROCEDURE — 93005 ELECTROCARDIOGRAM TRACING: CPT

## 2025-04-26 PROCEDURE — 80053 COMPREHEN METABOLIC PANEL: CPT

## 2025-04-26 PROCEDURE — 83735 ASSAY OF MAGNESIUM: CPT

## 2025-04-26 PROCEDURE — 81001 URINALYSIS AUTO W/SCOPE: CPT

## 2025-04-26 PROCEDURE — 6360000002 HC RX W HCPCS

## 2025-04-26 PROCEDURE — 96374 THER/PROPH/DIAG INJ IV PUSH: CPT

## 2025-04-26 PROCEDURE — 36415 COLL VENOUS BLD VENIPUNCTURE: CPT

## 2025-04-26 PROCEDURE — 6370000000 HC RX 637 (ALT 250 FOR IP): Performed by: INTERNAL MEDICINE

## 2025-04-26 PROCEDURE — 84132 ASSAY OF SERUM POTASSIUM: CPT

## 2025-04-26 PROCEDURE — 6370000000 HC RX 637 (ALT 250 FOR IP)

## 2025-04-26 PROCEDURE — 96375 TX/PRO/DX INJ NEW DRUG ADDON: CPT

## 2025-04-26 PROCEDURE — 82010 KETONE BODYS QUAN: CPT

## 2025-04-26 PROCEDURE — 71045 X-RAY EXAM CHEST 1 VIEW: CPT

## 2025-04-26 PROCEDURE — 83690 ASSAY OF LIPASE: CPT

## 2025-04-26 PROCEDURE — 82962 GLUCOSE BLOOD TEST: CPT

## 2025-04-26 PROCEDURE — 99285 EMERGENCY DEPT VISIT HI MDM: CPT

## 2025-04-26 PROCEDURE — 2580000003 HC RX 258: Performed by: STUDENT IN AN ORGANIZED HEALTH CARE EDUCATION/TRAINING PROGRAM

## 2025-04-26 PROCEDURE — 85025 COMPLETE CBC W/AUTO DIFF WBC: CPT

## 2025-04-26 PROCEDURE — 83036 HEMOGLOBIN GLYCOSYLATED A1C: CPT

## 2025-04-26 PROCEDURE — 2500000003 HC RX 250 WO HCPCS

## 2025-04-26 PROCEDURE — 82805 BLOOD GASES W/O2 SATURATION: CPT

## 2025-04-26 RX ORDER — DEXTROSE MONOHYDRATE 100 MG/ML
INJECTION, SOLUTION INTRAVENOUS CONTINUOUS PRN
Status: DISCONTINUED | OUTPATIENT
Start: 2025-04-26 | End: 2025-04-26

## 2025-04-26 RX ORDER — FAMOTIDINE 20 MG/1
20 TABLET, FILM COATED ORAL 2 TIMES DAILY
Status: DISCONTINUED | OUTPATIENT
Start: 2025-04-26 | End: 2025-04-29 | Stop reason: HOSPADM

## 2025-04-26 RX ORDER — INDOMETHACIN 25 MG/1
50 CAPSULE ORAL ONCE
Status: COMPLETED | OUTPATIENT
Start: 2025-04-26 | End: 2025-04-26

## 2025-04-26 RX ORDER — 0.9 % SODIUM CHLORIDE 0.9 %
2000 INTRAVENOUS SOLUTION INTRAVENOUS ONCE
Status: COMPLETED | OUTPATIENT
Start: 2025-04-26 | End: 2025-04-26

## 2025-04-26 RX ORDER — POTASSIUM CHLORIDE 7.45 MG/ML
10 INJECTION INTRAVENOUS PRN
Status: DISCONTINUED | OUTPATIENT
Start: 2025-04-26 | End: 2025-04-29 | Stop reason: HOSPADM

## 2025-04-26 RX ORDER — ATORVASTATIN CALCIUM 10 MG/1
20 TABLET, FILM COATED ORAL DAILY
Status: DISCONTINUED | OUTPATIENT
Start: 2025-04-26 | End: 2025-04-29 | Stop reason: HOSPADM

## 2025-04-26 RX ORDER — GLUCAGON 1 MG/ML
1 KIT INJECTION PRN
Status: DISCONTINUED | OUTPATIENT
Start: 2025-04-26 | End: 2025-04-26 | Stop reason: SDUPTHER

## 2025-04-26 RX ORDER — MAGNESIUM SULFATE IN WATER 40 MG/ML
2000 INJECTION, SOLUTION INTRAVENOUS PRN
Status: DISCONTINUED | OUTPATIENT
Start: 2025-04-26 | End: 2025-04-29 | Stop reason: HOSPADM

## 2025-04-26 RX ORDER — SODIUM CHLORIDE 9 MG/ML
INJECTION, SOLUTION INTRAVENOUS CONTINUOUS
Status: DISCONTINUED | OUTPATIENT
Start: 2025-04-26 | End: 2025-04-29 | Stop reason: HOSPADM

## 2025-04-26 RX ORDER — ALBUTEROL SULFATE 0.83 MG/ML
10 SOLUTION RESPIRATORY (INHALATION) ONCE
Status: COMPLETED | OUTPATIENT
Start: 2025-04-26 | End: 2025-04-26

## 2025-04-26 RX ORDER — ENOXAPARIN SODIUM 100 MG/ML
40 INJECTION SUBCUTANEOUS DAILY
Status: DISCONTINUED | OUTPATIENT
Start: 2025-04-26 | End: 2025-04-29 | Stop reason: HOSPADM

## 2025-04-26 RX ORDER — GLUCAGON 1 MG/ML
1 KIT INJECTION PRN
Status: DISCONTINUED | OUTPATIENT
Start: 2025-04-26 | End: 2025-04-29 | Stop reason: HOSPADM

## 2025-04-26 RX ORDER — DEXTROSE MONOHYDRATE 100 MG/ML
INJECTION, SOLUTION INTRAVENOUS CONTINUOUS PRN
Status: DISCONTINUED | OUTPATIENT
Start: 2025-04-26 | End: 2025-04-29 | Stop reason: HOSPADM

## 2025-04-26 RX ORDER — POLYETHYLENE GLYCOL 3350 17 G/17G
17 POWDER, FOR SOLUTION ORAL DAILY PRN
Status: DISCONTINUED | OUTPATIENT
Start: 2025-04-26 | End: 2025-04-29 | Stop reason: HOSPADM

## 2025-04-26 RX ORDER — 0.9 % SODIUM CHLORIDE 0.9 %
15 INTRAVENOUS SOLUTION INTRAVENOUS ONCE
Status: COMPLETED | OUTPATIENT
Start: 2025-04-26 | End: 2025-04-26

## 2025-04-26 RX ORDER — DEXTROSE MONOHYDRATE, SODIUM CHLORIDE, AND POTASSIUM CHLORIDE 50; 1.49; 4.5 G/1000ML; G/1000ML; G/1000ML
INJECTION, SOLUTION INTRAVENOUS CONTINUOUS PRN
Status: DISCONTINUED | OUTPATIENT
Start: 2025-04-26 | End: 2025-04-29 | Stop reason: HOSPADM

## 2025-04-26 RX ORDER — CALCIUM GLUCONATE 20 MG/ML
1000 INJECTION, SOLUTION INTRAVENOUS ONCE
Status: COMPLETED | OUTPATIENT
Start: 2025-04-26 | End: 2025-04-26

## 2025-04-26 RX ADMIN — CALCIUM GLUCONATE 1000 MG: 20 INJECTION, SOLUTION INTRAVENOUS at 18:56

## 2025-04-26 RX ADMIN — ATORVASTATIN CALCIUM 20 MG: 10 TABLET, FILM COATED ORAL at 21:37

## 2025-04-26 RX ADMIN — ENOXAPARIN SODIUM 40 MG: 100 INJECTION SUBCUTANEOUS at 21:37

## 2025-04-26 RX ADMIN — ALBUTEROL SULFATE 10 MG: 2.5 SOLUTION RESPIRATORY (INHALATION) at 18:35

## 2025-04-26 RX ADMIN — FAMOTIDINE 20 MG: 20 TABLET, FILM COATED ORAL at 21:31

## 2025-04-26 RX ADMIN — INSULIN HUMAN 18 UNITS: 100 INJECTION, SOLUTION PARENTERAL at 19:28

## 2025-04-26 RX ADMIN — SODIUM CHLORIDE 10.8 UNITS/HR: 9 INJECTION, SOLUTION INTRAVENOUS at 19:28

## 2025-04-26 RX ADMIN — SODIUM BICARBONATE 50 MEQ: 84 INJECTION, SOLUTION INTRAVENOUS at 18:57

## 2025-04-26 RX ADMIN — SODIUM CHLORIDE 1000 ML: 0.9 INJECTION, SOLUTION INTRAVENOUS at 20:54

## 2025-04-26 RX ADMIN — SODIUM CHLORIDE: 0.9 INJECTION, SOLUTION INTRAVENOUS at 22:29

## 2025-04-26 RX ADMIN — SODIUM CHLORIDE 2000 ML: 9 INJECTION, SOLUTION INTRAVENOUS at 18:55

## 2025-04-26 NOTE — ED NOTES
1911 notified Dr Cutler on in patient consult from critical care provider. Added to treatment team

## 2025-04-26 NOTE — DISCHARGE INSTRUCTIONS
[id number] [phone]    Transpò  S.C.A.T:  ofri sèvis otobis nan limit eric [place]. ADA ofri sèvis pòt an pòt swati mildred langley andikap  [phone]    Max cornell vwayaj:    [phone]      [place] Navigate Pwogram, Asiste Kominote Ayisyen ak ispano nan komin Liu-Sevis lopez referans-[facility], [address] and [address]  Junior 4:00pm-7:00PM  Mekredi 4:00pm-7:00pm  Samdi 9:00am-12:00pm  _________________________________________________________________________________  1. Call to schedule follow up hospital follow up appointment:  [facility]  [address]  [address]  [place] [address]  Tel: [phone]  [address]  [place] [address]  [phone]  [address]  [phone], Azerbaijani Speaking Staff.    2. Establish care with a primary care provider  Physician Finder [phone], Assist to establish a Family     [facility]  [address], [address], [place] [address]  [phone]  Caity Garibay APRN,CNP  [facility]  [address] [place] [address]  [phone]  MD Lois Turner MD Deanna N. Smith, APRN, CNP  Bibi Yi, DO Femi Rowland APRN, CNP  [facility] [address], [address], [place] [address]  [phone]  DO Yordy Nunez PA-C Michael S. McKee, MD Marin Kitchens, PA-C  [facility]  [facility] [place] [id number]  [phone]  MD Alejandro Carey MD  [facility]  [address] [place] [address] [phone]  MD Eddie Aguilar MD  [facility]  [address] [place], [place]

## 2025-04-26 NOTE — H&P
[facility] Critical Care H&P Note      Name:  Adrianna Landin /Age/Sex: 1985  (40 y.o. male)   MRN & CSN:  [id number] & [id number] Encounter Date/Time: 2025 7:52 PM EDT   Location:  ED17/ED-17 PCP: No primary care provider on file.     Attending:Khloe Simmons MD  Consulting Provider: Perlita Harper DO      Hospital Day: 1    History Obtained From:    Patient (with use of )    History of Present Illness:     Chief Complaint: DKA, type 1, not at goal (HCC)    Adrianna Landin is a 40 y.o. Yemeni-Creole speaking male with pmh IDDM who presented to the ED with heartburn, malaise and increased thirst. Patient reports he was recently admitted to the ICU for DKA and MITZY last week. He was unable to afford his medications and notes he has been feeling ill since 2 days after he was discharged last week. He reports experiencing heartburn and feeling generally unwell. Denies fevers, chills, chest pain, shortness of breath, nausea, vomiting, diarrhea, bloody stools, dysuria or hematuria. Labs on intake are consistent with DKA and patient has been started on an insulin gtt.     Labs and radiological studies reviewed independently. Denies hx of tobacco, alcohol or drug abuse. History obtained with assistance of a virtual Yemeni-Creole . Agree with transfer to ICU for further evaluation and care for management of DKA.       Review of Systems:    Review of Systems    As noted above     Objective:   No intake or output data in the 24 hours ending 25   Vitals:   Vitals:    25 1539 25   BP: (!) 140/94    Pulse: (!) 112 (!) 133   Resp: 18 24   Temp: 98.3 °F (36.8 °C)    SpO2: 98% 100%   Weight: 68 kg (150 lb)    Height: 1.6 m (5' 2.99\")        Medications Prior to Admission     Prior to Admission medications    Medication Sig Start Date End Date Taking? Authorizing Provider   insulin glargine (LANTUS SOLOSTAR) 100 UNIT/ML injection pen Inject 30 Units into the skin

## 2025-04-26 NOTE — CARE COORDINATION
CM was consulted for discharge planning while in ER.  Acute findings for DKA, POC changed to admission.    Review of pt chart, Pt is Medicaid pending.  Last saw Dr Cutler in Jan, no other visits noted.    Pt is on Med Assist Flagged list.  Noncompliance with managing diabetes.  Pt stated unable to afford medications.    POC is for admission at this time. CHIDIRN/CM

## 2025-04-26 NOTE — ED PROVIDER NOTES
range)         I am the Primary Clinician of Record.    Is this patient to be included in the SEP-1 Core Measure due to severe sepsis or septic shock?   No   Exclusion criteria - the patient is NOT to be included for SEP-1 Core Measure due to:  Infection is not suspected    Critical Care Time:  The services I provided to this patient were to treat and/or prevent clinically significant deterioration that could result in the failure of one or more body systems and/or organ systems.    Services included the following:  -reviewing nursing notes and old charts  -vital sign assessments  -direct patient care  -medication orders and management  -interpreting and reviewing diagnostic studies/labs  -re-evaluations  -documentation time    Aggregate critical care time was 40 minutes, which includes only time during which I was engaged in work directly related to the patient's care as described above, whether I was at bedside or elsewhere in the Emergency Department. It did not include time spent performing other reported procedures or the services of residents, students, nurses, or advance practice providers.      All diagnostic, treatment, and disposition decisions were made by myself in conjunction with the advanced practice provider.    I personally saw the patient and made/approved the management plan and take responsibility for the patient management    I personally saw the patient and independently provided 20 minutes of non-concurrent critical care of the total shared critical care time provided.     For all further details of the patient's emergency department visit, please see the advanced practice provider's documentation.    Clinical Impression:  1. Diabetic ketoacidosis without coma associated with type 1 diabetes mellitus (HCC)    2. Hyperkalemia        Comment: Please note this report has been produced using speech recognition software and may contain errors related to that system including errors in grammar, 
Clinician of Record.  FINAL IMPRESSION      1. Diabetic ketoacidosis without coma associated with type 1 diabetes mellitus (HCC)    2. Hyperkalemia          DISPOSITION/PLAN     DISPOSITION Admitted 04/26/2025 06:59:42 PM               PATIENT REFERRED TO:  No follow-up provider specified.    DISCHARGE MEDICATIONS:  New Prescriptions    No medications on file       DISCONTINUED MEDICATIONS:  Discontinued Medications    No medications on file              (Please note that portions of this note were completed with a voice recognition program.  Efforts were made to edit the dictations but occasionally words are mis-transcribed.)    CHEY Long CNP (electronically signed)        Sanaz Evangelista APRN - CNP  04/26/25 1913       Sanaz Evangelista APRN - CNP  04/27/25 1050

## 2025-04-27 LAB
ANION GAP SERPL CALCULATED.3IONS-SCNC: 10 MMOL/L (ref 9–17)
ANION GAP SERPL CALCULATED.3IONS-SCNC: 18 MMOL/L (ref 9–17)
BASOPHILS # BLD: 0.01 K/UL
BASOPHILS NFR BLD: 0 % (ref 0–1)
BUN SERPL-MCNC: 6 MG/DL (ref 7–20)
BUN SERPL-MCNC: 8 MG/DL (ref 7–20)
CALCIUM SERPL-MCNC: 7.4 MG/DL (ref 8.3–10.6)
CALCIUM SERPL-MCNC: 7.8 MG/DL (ref 8.3–10.6)
CHLORIDE SERPL-SCNC: 111 MMOL/L (ref 99–110)
CHLORIDE SERPL-SCNC: 114 MMOL/L (ref 99–110)
CO2 SERPL-SCNC: 11 MMOL/L (ref 21–32)
CO2 SERPL-SCNC: 16 MMOL/L (ref 21–32)
CREAT SERPL-MCNC: 0.9 MG/DL (ref 0.9–1.3)
CREAT SERPL-MCNC: 1.1 MG/DL (ref 0.9–1.3)
EOSINOPHIL # BLD: 0.04 K/UL
EOSINOPHILS RELATIVE PERCENT: 1 % (ref 0–3)
ERYTHROCYTE [DISTWIDTH] IN BLOOD BY AUTOMATED COUNT: 14.6 % (ref 11.7–14.9)
GFR, ESTIMATED: 78 ML/MIN/1.73M2
GFR, ESTIMATED: >90 ML/MIN/1.73M2
GLUCOSE BLD-MCNC: 116 MG/DL (ref 74–99)
GLUCOSE BLD-MCNC: 123 MG/DL (ref 74–99)
GLUCOSE BLD-MCNC: 149 MG/DL (ref 74–99)
GLUCOSE BLD-MCNC: 151 MG/DL (ref 74–99)
GLUCOSE BLD-MCNC: 151 MG/DL (ref 74–99)
GLUCOSE BLD-MCNC: 161 MG/DL (ref 74–99)
GLUCOSE BLD-MCNC: 197 MG/DL (ref 74–99)
GLUCOSE BLD-MCNC: 199 MG/DL (ref 74–99)
GLUCOSE BLD-MCNC: 208 MG/DL (ref 74–99)
GLUCOSE BLD-MCNC: 223 MG/DL (ref 74–99)
GLUCOSE BLD-MCNC: 261 MG/DL (ref 74–99)
GLUCOSE BLD-MCNC: 341 MG/DL (ref 74–99)
GLUCOSE BLD-MCNC: 383 MG/DL (ref 74–99)
GLUCOSE SERPL-MCNC: 169 MG/DL (ref 74–99)
GLUCOSE SERPL-MCNC: 338 MG/DL (ref 74–99)
HCT VFR BLD AUTO: 36.7 % (ref 42–52)
HGB BLD-MCNC: 12.5 G/DL (ref 13.5–18)
IMM GRANULOCYTES # BLD AUTO: 0.02 K/UL
IMM GRANULOCYTES NFR BLD: 0 %
LYMPHOCYTES NFR BLD: 1.22 K/UL
LYMPHOCYTES RELATIVE PERCENT: 20 % (ref 24–44)
MAGNESIUM SERPL-MCNC: 1.6 MG/DL (ref 1.8–2.4)
MAGNESIUM SERPL-MCNC: 1.8 MG/DL (ref 1.8–2.4)
MAGNESIUM SERPL-MCNC: 2 MG/DL (ref 1.8–2.4)
MCH RBC QN AUTO: 27.6 PG (ref 27–31)
MCHC RBC AUTO-ENTMCNC: 34.1 G/DL (ref 32–36)
MCV RBC AUTO: 81 FL (ref 78–100)
MONOCYTES NFR BLD: 0.89 K/UL
MONOCYTES NFR BLD: 15 % (ref 0–5)
NEUTROPHILS NFR BLD: 64 % (ref 36–66)
NEUTS SEG NFR BLD: 3.89 K/UL
PHOSPHATE SERPL-MCNC: 0.6 MG/DL (ref 2.5–4.9)
PHOSPHATE SERPL-MCNC: 1.1 MG/DL (ref 2.5–4.9)
PHOSPHATE SERPL-MCNC: 1.6 MG/DL (ref 2.5–4.9)
PLATELET # BLD AUTO: 304 K/UL (ref 140–440)
PMV BLD AUTO: 10.4 FL (ref 7.5–11.1)
POTASSIUM SERPL-SCNC: 3 MMOL/L (ref 3.5–5.1)
POTASSIUM SERPL-SCNC: 3.6 MMOL/L (ref 3.5–5.1)
POTASSIUM SERPL-SCNC: 3.6 MMOL/L (ref 3.5–5.1)
RBC # BLD AUTO: 4.53 M/UL (ref 4.6–6.2)
SODIUM SERPL-SCNC: 139 MMOL/L (ref 136–145)
SODIUM SERPL-SCNC: 139 MMOL/L (ref 136–145)
WBC OTHER # BLD: 6.1 K/UL (ref 4–10.5)

## 2025-04-27 PROCEDURE — 36415 COLL VENOUS BLD VENIPUNCTURE: CPT

## 2025-04-27 PROCEDURE — 83735 ASSAY OF MAGNESIUM: CPT

## 2025-04-27 PROCEDURE — 85025 COMPLETE CBC W/AUTO DIFF WBC: CPT

## 2025-04-27 PROCEDURE — 6370000000 HC RX 637 (ALT 250 FOR IP): Performed by: INTERNAL MEDICINE

## 2025-04-27 PROCEDURE — 94761 N-INVAS EAR/PLS OXIMETRY MLT: CPT

## 2025-04-27 PROCEDURE — 76937 US GUIDE VASCULAR ACCESS: CPT

## 2025-04-27 PROCEDURE — 82962 GLUCOSE BLOOD TEST: CPT

## 2025-04-27 PROCEDURE — 6360000002 HC RX W HCPCS: Performed by: STUDENT IN AN ORGANIZED HEALTH CARE EDUCATION/TRAINING PROGRAM

## 2025-04-27 PROCEDURE — 1200000000 HC SEMI PRIVATE

## 2025-04-27 PROCEDURE — 2500000003 HC RX 250 WO HCPCS: Performed by: INTERNAL MEDICINE

## 2025-04-27 PROCEDURE — 2580000003 HC RX 258: Performed by: INTERNAL MEDICINE

## 2025-04-27 PROCEDURE — 84132 ASSAY OF SERUM POTASSIUM: CPT

## 2025-04-27 PROCEDURE — 2580000003 HC RX 258

## 2025-04-27 PROCEDURE — 2500000003 HC RX 250 WO HCPCS: Performed by: STUDENT IN AN ORGANIZED HEALTH CARE EDUCATION/TRAINING PROGRAM

## 2025-04-27 PROCEDURE — C1751 CATH, INF, PER/CENT/MIDLINE: HCPCS

## 2025-04-27 PROCEDURE — 2580000003 HC RX 258: Performed by: STUDENT IN AN ORGANIZED HEALTH CARE EDUCATION/TRAINING PROGRAM

## 2025-04-27 PROCEDURE — 84100 ASSAY OF PHOSPHORUS: CPT

## 2025-04-27 PROCEDURE — 6370000000 HC RX 637 (ALT 250 FOR IP)

## 2025-04-27 PROCEDURE — 80048 BASIC METABOLIC PNL TOTAL CA: CPT

## 2025-04-27 RX ORDER — SODIUM CHLORIDE 9 MG/ML
INJECTION, SOLUTION INTRAVENOUS CONTINUOUS
Status: DISCONTINUED | OUTPATIENT
Start: 2025-04-27 | End: 2025-04-29 | Stop reason: HOSPADM

## 2025-04-27 RX ORDER — INSULIN GLARGINE 100 [IU]/ML
30 INJECTION, SOLUTION SUBCUTANEOUS NIGHTLY
Status: DISCONTINUED | OUTPATIENT
Start: 2025-04-27 | End: 2025-04-28

## 2025-04-27 RX ORDER — SODIUM CHLORIDE 0.9 % (FLUSH) 0.9 %
5-40 SYRINGE (ML) INJECTION PRN
Status: DISCONTINUED | OUTPATIENT
Start: 2025-04-27 | End: 2025-04-29 | Stop reason: HOSPADM

## 2025-04-27 RX ORDER — SODIUM CHLORIDE 0.9 % (FLUSH) 0.9 %
5-40 SYRINGE (ML) INJECTION EVERY 12 HOURS SCHEDULED
Status: DISCONTINUED | OUTPATIENT
Start: 2025-04-27 | End: 2025-04-29 | Stop reason: HOSPADM

## 2025-04-27 RX ORDER — LIDOCAINE HYDROCHLORIDE 10 MG/ML
50 INJECTION, SOLUTION INFILTRATION; PERINEURAL ONCE
Status: DISCONTINUED | OUTPATIENT
Start: 2025-04-27 | End: 2025-04-29 | Stop reason: HOSPADM

## 2025-04-27 RX ORDER — INSULIN LISPRO 100 [IU]/ML
0-8 INJECTION, SOLUTION INTRAVENOUS; SUBCUTANEOUS
Status: DISCONTINUED | OUTPATIENT
Start: 2025-04-27 | End: 2025-04-29 | Stop reason: HOSPADM

## 2025-04-27 RX ORDER — INSULIN LISPRO 100 [IU]/ML
10 INJECTION, SOLUTION INTRAVENOUS; SUBCUTANEOUS
Status: DISCONTINUED | OUTPATIENT
Start: 2025-04-27 | End: 2025-04-29 | Stop reason: HOSPADM

## 2025-04-27 RX ORDER — SODIUM CHLORIDE 9 MG/ML
INJECTION, SOLUTION INTRAVENOUS PRN
Status: DISCONTINUED | OUTPATIENT
Start: 2025-04-27 | End: 2025-04-29 | Stop reason: HOSPADM

## 2025-04-27 RX ADMIN — INSULIN LISPRO 6 UNITS: 100 INJECTION, SOLUTION INTRAVENOUS; SUBCUTANEOUS at 22:50

## 2025-04-27 RX ADMIN — FAMOTIDINE 20 MG: 20 TABLET, FILM COATED ORAL at 08:34

## 2025-04-27 RX ADMIN — SODIUM CHLORIDE, PRESERVATIVE FREE 10 ML: 5 INJECTION INTRAVENOUS at 22:50

## 2025-04-27 RX ADMIN — POTASSIUM CHLORIDE 10 MEQ: 7.46 INJECTION, SOLUTION INTRAVENOUS at 02:43

## 2025-04-27 RX ADMIN — POTASSIUM CHLORIDE 10 MEQ: 7.46 INJECTION, SOLUTION INTRAVENOUS at 09:40

## 2025-04-27 RX ADMIN — POTASSIUM CHLORIDE 10 MEQ: 7.46 INJECTION, SOLUTION INTRAVENOUS at 08:43

## 2025-04-27 RX ADMIN — SODIUM CHLORIDE: 0.9 INJECTION, SOLUTION INTRAVENOUS at 22:55

## 2025-04-27 RX ADMIN — INSULIN LISPRO 10 UNITS: 100 INJECTION, SOLUTION INTRAVENOUS; SUBCUTANEOUS at 09:33

## 2025-04-27 RX ADMIN — SODIUM CHLORIDE 4.5 UNITS/HR: 9 INJECTION, SOLUTION INTRAVENOUS at 07:20

## 2025-04-27 RX ADMIN — POTASSIUM CHLORIDE 10 MEQ: 7.46 INJECTION, SOLUTION INTRAVENOUS at 01:34

## 2025-04-27 RX ADMIN — POTASSIUM CHLORIDE 10 MEQ: 7.46 INJECTION, SOLUTION INTRAVENOUS at 12:23

## 2025-04-27 RX ADMIN — FAMOTIDINE 20 MG: 20 TABLET, FILM COATED ORAL at 22:49

## 2025-04-27 RX ADMIN — SODIUM CHLORIDE: 0.9 INJECTION, SOLUTION INTRAVENOUS at 12:36

## 2025-04-27 RX ADMIN — ATORVASTATIN CALCIUM 20 MG: 10 TABLET, FILM COATED ORAL at 08:34

## 2025-04-27 RX ADMIN — MAGNESIUM SULFATE HEPTAHYDRATE 2000 MG: 40 INJECTION, SOLUTION INTRAVENOUS at 08:46

## 2025-04-27 RX ADMIN — INSULIN GLARGINE 30 UNITS: 100 INJECTION, SOLUTION SUBCUTANEOUS at 22:49

## 2025-04-27 RX ADMIN — POTASSIUM CHLORIDE, DEXTROSE MONOHYDRATE AND SODIUM CHLORIDE: 150; 5; 450 INJECTION, SOLUTION INTRAVENOUS at 10:04

## 2025-04-27 RX ADMIN — SODIUM CHLORIDE, PRESERVATIVE FREE 10 ML: 5 INJECTION INTRAVENOUS at 08:34

## 2025-04-27 RX ADMIN — SODIUM PHOSPHATE, MONOBASIC, MONOHYDRATE AND SODIUM PHOSPHATE, DIBASIC, ANHYDROUS 15 MMOL: 142; 276 INJECTION, SOLUTION INTRAVENOUS at 09:01

## 2025-04-27 RX ADMIN — ENOXAPARIN SODIUM 40 MG: 100 INJECTION SUBCUTANEOUS at 08:34

## 2025-04-27 RX ADMIN — INSULIN LISPRO 10 UNITS: 100 INJECTION, SOLUTION INTRAVENOUS; SUBCUTANEOUS at 13:33

## 2025-04-27 RX ADMIN — POTASSIUM CHLORIDE 10 MEQ: 7.46 INJECTION, SOLUTION INTRAVENOUS at 03:46

## 2025-04-27 RX ADMIN — POTASSIUM CHLORIDE, DEXTROSE MONOHYDRATE AND SODIUM CHLORIDE: 150; 5; 450 INJECTION, SOLUTION INTRAVENOUS at 02:44

## 2025-04-27 RX ADMIN — SODIUM PHOSPHATE, MONOBASIC, MONOHYDRATE AND SODIUM PHOSPHATE, DIBASIC, ANHYDROUS 15 MMOL: 142; 276 INJECTION, SOLUTION INTRAVENOUS at 02:52

## 2025-04-27 ASSESSMENT — SOCIAL DETERMINANTS OF HEALTH (SDOH)
HOW HARD IS IT FOR YOU TO PAY FOR THE VERY BASICS LIKE FOOD, HOUSING, MEDICAL CARE, AND HEATING?: PATIENT UNABLE TO ANSWER

## 2025-04-27 NOTE — CONSULTS
Endocrinology   Consult Note  4/26/2025  5:33 PM     Primary Care provider: No primary care provider on file.     Referring physician:  Khloe Simmons MD     Dear Doctor     Thank You for the Consult     Pt. Was Admitted for : Nausea vomiting abdominal discomfort and hyperglycemia DKA    Reason for Consult: Better control of blood glucose      History Obtained From:  Patient/ EMR       HISTORY OF PRESENT ILLNESS:                The patient is a 40 y.o. male with significant past medical history of diabetes mellitus recently admitted and discharged the hospital with variable bradycardia and same issue of not having money and paper insulin she did not take any insulin for last 1 week and ended up having a nausea vomiting and hyperglycemia and DKA.  Patient was admitted to ICU after starting IV insulin drip protocol I was  consulted for better control of blood glucose.       ROS:   Pt's ROS done in detail.  Abnormal ROS are noted in Medical and Surgical History Section below:     Other Medical History:    History reviewed. No pertinent past medical history.  Surgical History:    History reviewed. No pertinent surgical history.    Allergies:  Patient has no known allergies.    Family History:   History reviewed. No pertinent family history.  REVIEW OF SYSTEMS:  Review of System Done as noted above     PHYSICAL EXAM:      Vitals:    /82   Pulse (!) 123   Temp 98.5 °F (36.9 °C) (Oral)   Resp 24   Ht 1.6 m (5' 2.99\")   Wt 68 kg (150 lb)   SpO2 99%   BMI 26.58 kg/m²     CONSTITUTIONAL:  awake, alert, cooperative, appears stated age   EYES:  vision intact Fundoscopic Exam not performed   ENT:Normal  NECK:  Supple, No JVD.   Thyroid Exam:Normal   LUNGS:  Has Vesicular Breath Sounds,   CARDIOVASCULAR:  Normal apical impulse, regular rate and rhythm, normal S1 and S2, no S3 or S4, and has no  murmur   ABDOMEN:  No scars, normal bowel sounds, soft, non-distended, mild tender, no masses palpated, no

## 2025-04-27 NOTE — CARE COORDINATION
Information about getting diabetes supplies from the [facility] added to AVS as well as their [employer] phone line.

## 2025-04-27 NOTE — CONSULTS
Consult reviewed with primary nurse.  Single lumen midline to be placed for critical medications and lab draws.  Education regarding placement of Midline catheter discussed with patient along with benefits and risks.  Consent given by patient.  Arrow Endurance Midline single lumen  catheter placed in UNIQUE using sterile ultrasound-guided technique.  Placement verified via ultrasound visualization of catheter within the vessel lumen.  Catheter returns blood briskly and flushes with ease and no abnormalities noted.  Patient tolerated well.  Please consult IV/PICC Team for any questions or if patient's needs change.

## 2025-04-28 LAB
ANION GAP SERPL CALCULATED.3IONS-SCNC: 10 MMOL/L (ref 9–17)
ANION GAP SERPL CALCULATED.3IONS-SCNC: 9 MMOL/L (ref 9–17)
BASOPHILS # BLD: 0 K/UL
BASOPHILS NFR BLD: 0 % (ref 0–1)
BUN SERPL-MCNC: 4 MG/DL (ref 7–20)
BUN SERPL-MCNC: 6 MG/DL (ref 7–20)
CALCIUM SERPL-MCNC: 7.7 MG/DL (ref 8.3–10.6)
CALCIUM SERPL-MCNC: 8 MG/DL (ref 8.3–10.6)
CHLORIDE SERPL-SCNC: 106 MMOL/L (ref 99–110)
CHLORIDE SERPL-SCNC: 109 MMOL/L (ref 99–110)
CO2 SERPL-SCNC: 17 MMOL/L (ref 21–32)
CO2 SERPL-SCNC: 20 MMOL/L (ref 21–32)
CREAT SERPL-MCNC: 0.6 MG/DL (ref 0.9–1.3)
CREAT SERPL-MCNC: 0.7 MG/DL (ref 0.9–1.3)
EKG ATRIAL RATE: 107 BPM
EKG DIAGNOSIS: NORMAL
EKG P AXIS: 51 DEGREES
EKG P-R INTERVAL: 140 MS
EKG Q-T INTERVAL: 328 MS
EKG QRS DURATION: 74 MS
EKG QTC CALCULATION (BAZETT): 437 MS
EKG R AXIS: 68 DEGREES
EKG T AXIS: 57 DEGREES
EKG VENTRICULAR RATE: 107 BPM
EOSINOPHIL # BLD: 0.05 K/UL
EOSINOPHILS RELATIVE PERCENT: 1 % (ref 0–3)
ERYTHROCYTE [DISTWIDTH] IN BLOOD BY AUTOMATED COUNT: 14.9 % (ref 11.7–14.9)
GFR, ESTIMATED: >90 ML/MIN/1.73M2
GFR, ESTIMATED: >90 ML/MIN/1.73M2
GLUCOSE BLD-MCNC: 156 MG/DL (ref 74–99)
GLUCOSE BLD-MCNC: 179 MG/DL (ref 74–99)
GLUCOSE BLD-MCNC: 182 MG/DL (ref 74–99)
GLUCOSE BLD-MCNC: 185 MG/DL (ref 74–99)
GLUCOSE BLD-MCNC: 254 MG/DL (ref 74–99)
GLUCOSE BLD-MCNC: 298 MG/DL (ref 74–99)
GLUCOSE BLD-MCNC: >600 MG/DL (ref 74–99)
GLUCOSE BLD-MCNC: >600 MG/DL (ref 74–99)
GLUCOSE SERPL-MCNC: 148 MG/DL (ref 74–99)
GLUCOSE SERPL-MCNC: 252 MG/DL (ref 74–99)
HCT VFR BLD AUTO: 35.2 % (ref 42–52)
HGB BLD-MCNC: 11.7 G/DL (ref 13.5–18)
IMM GRANULOCYTES # BLD AUTO: 0.01 K/UL
IMM GRANULOCYTES NFR BLD: 0 %
LYMPHOCYTES NFR BLD: 1.74 K/UL
LYMPHOCYTES RELATIVE PERCENT: 41 % (ref 24–44)
MAGNESIUM SERPL-MCNC: 1.7 MG/DL (ref 1.8–2.4)
MAGNESIUM SERPL-MCNC: 1.9 MG/DL (ref 1.8–2.4)
MCH RBC QN AUTO: 27.7 PG (ref 27–31)
MCHC RBC AUTO-ENTMCNC: 33.2 G/DL (ref 32–36)
MCV RBC AUTO: 83.4 FL (ref 78–100)
MONOCYTES NFR BLD: 0.43 K/UL
MONOCYTES NFR BLD: 10 % (ref 0–5)
NEUTROPHILS NFR BLD: 48 % (ref 36–66)
NEUTS SEG NFR BLD: 2.05 K/UL
PHOSPHATE SERPL-MCNC: 0.7 MG/DL (ref 2.5–4.9)
PLATELET # BLD AUTO: 273 K/UL (ref 140–440)
PMV BLD AUTO: 10.4 FL (ref 7.5–11.1)
POTASSIUM SERPL-SCNC: 2.8 MMOL/L (ref 3.5–5.1)
POTASSIUM SERPL-SCNC: 3.4 MMOL/L (ref 3.5–5.1)
RBC # BLD AUTO: 4.22 M/UL (ref 4.6–6.2)
SODIUM SERPL-SCNC: 133 MMOL/L (ref 136–145)
SODIUM SERPL-SCNC: 138 MMOL/L (ref 136–145)
WBC OTHER # BLD: 4.3 K/UL (ref 4–10.5)

## 2025-04-28 PROCEDURE — 82962 GLUCOSE BLOOD TEST: CPT

## 2025-04-28 PROCEDURE — 85025 COMPLETE CBC W/AUTO DIFF WBC: CPT

## 2025-04-28 PROCEDURE — 6370000000 HC RX 637 (ALT 250 FOR IP): Performed by: INTERNAL MEDICINE

## 2025-04-28 PROCEDURE — 2580000003 HC RX 258: Performed by: STUDENT IN AN ORGANIZED HEALTH CARE EDUCATION/TRAINING PROGRAM

## 2025-04-28 PROCEDURE — 36415 COLL VENOUS BLD VENIPUNCTURE: CPT

## 2025-04-28 PROCEDURE — 2500000003 HC RX 250 WO HCPCS: Performed by: STUDENT IN AN ORGANIZED HEALTH CARE EDUCATION/TRAINING PROGRAM

## 2025-04-28 PROCEDURE — 84100 ASSAY OF PHOSPHORUS: CPT

## 2025-04-28 PROCEDURE — 6360000002 HC RX W HCPCS: Performed by: NURSE PRACTITIONER

## 2025-04-28 PROCEDURE — 6370000000 HC RX 637 (ALT 250 FOR IP): Performed by: STUDENT IN AN ORGANIZED HEALTH CARE EDUCATION/TRAINING PROGRAM

## 2025-04-28 PROCEDURE — 6360000002 HC RX W HCPCS: Performed by: STUDENT IN AN ORGANIZED HEALTH CARE EDUCATION/TRAINING PROGRAM

## 2025-04-28 PROCEDURE — 6370000000 HC RX 637 (ALT 250 FOR IP): Performed by: NURSE PRACTITIONER

## 2025-04-28 PROCEDURE — 80048 BASIC METABOLIC PNL TOTAL CA: CPT

## 2025-04-28 PROCEDURE — 1200000000 HC SEMI PRIVATE

## 2025-04-28 PROCEDURE — 93010 ELECTROCARDIOGRAM REPORT: CPT | Performed by: INTERNAL MEDICINE

## 2025-04-28 PROCEDURE — 2580000003 HC RX 258: Performed by: INTERNAL MEDICINE

## 2025-04-28 PROCEDURE — 94761 N-INVAS EAR/PLS OXIMETRY MLT: CPT

## 2025-04-28 PROCEDURE — 83735 ASSAY OF MAGNESIUM: CPT

## 2025-04-28 RX ORDER — POTASSIUM CHLORIDE 7.45 MG/ML
10 INJECTION INTRAVENOUS
Status: COMPLETED | OUTPATIENT
Start: 2025-04-28 | End: 2025-04-28

## 2025-04-28 RX ORDER — POTASSIUM CHLORIDE 1500 MG/1
40 TABLET, EXTENDED RELEASE ORAL 2 TIMES DAILY WITH MEALS
Status: COMPLETED | OUTPATIENT
Start: 2025-04-28 | End: 2025-04-28

## 2025-04-28 RX ORDER — POTASSIUM CHLORIDE 1500 MG/1
40 TABLET, EXTENDED RELEASE ORAL ONCE
Status: COMPLETED | OUTPATIENT
Start: 2025-04-28 | End: 2025-04-28

## 2025-04-28 RX ORDER — INSULIN GLARGINE 100 [IU]/ML
35 INJECTION, SOLUTION SUBCUTANEOUS NIGHTLY
Status: DISCONTINUED | OUTPATIENT
Start: 2025-04-28 | End: 2025-04-29 | Stop reason: HOSPADM

## 2025-04-28 RX ORDER — ACETAMINOPHEN 325 MG/1
650 TABLET ORAL EVERY 4 HOURS PRN
Status: DISCONTINUED | OUTPATIENT
Start: 2025-04-28 | End: 2025-04-29 | Stop reason: HOSPADM

## 2025-04-28 RX ADMIN — DIBASIC SODIUM PHOSPHATE, MONOBASIC POTASSIUM PHOSPHATE AND MONOBASIC SODIUM PHOSPHATE 2 TABLET: 852; 155; 130 TABLET ORAL at 10:02

## 2025-04-28 RX ADMIN — ENOXAPARIN SODIUM 40 MG: 100 INJECTION SUBCUTANEOUS at 10:03

## 2025-04-28 RX ADMIN — INSULIN LISPRO 2 UNITS: 100 INJECTION, SOLUTION INTRAVENOUS; SUBCUTANEOUS at 10:02

## 2025-04-28 RX ADMIN — INSULIN LISPRO 2 UNITS: 100 INJECTION, SOLUTION INTRAVENOUS; SUBCUTANEOUS at 12:50

## 2025-04-28 RX ADMIN — POTASSIUM CHLORIDE 40 MEQ: 1500 TABLET, EXTENDED RELEASE ORAL at 20:12

## 2025-04-28 RX ADMIN — POTASSIUM & SODIUM PHOSPHATES POWDER PACK 280-160-250 MG 250 MG: 280-160-250 PACK at 12:50

## 2025-04-28 RX ADMIN — POTASSIUM & SODIUM PHOSPHATES POWDER PACK 280-160-250 MG 250 MG: 280-160-250 PACK at 10:02

## 2025-04-28 RX ADMIN — POTASSIUM CHLORIDE 40 MEQ: 1500 TABLET, EXTENDED RELEASE ORAL at 10:02

## 2025-04-28 RX ADMIN — POTASSIUM CHLORIDE 40 MEQ: 1500 TABLET, EXTENDED RELEASE ORAL at 03:47

## 2025-04-28 RX ADMIN — POTASSIUM & SODIUM PHOSPHATES POWDER PACK 280-160-250 MG 250 MG: 280-160-250 PACK at 20:12

## 2025-04-28 RX ADMIN — INSULIN LISPRO 10 UNITS: 100 INJECTION, SOLUTION INTRAVENOUS; SUBCUTANEOUS at 10:03

## 2025-04-28 RX ADMIN — INSULIN LISPRO 10 UNITS: 100 INJECTION, SOLUTION INTRAVENOUS; SUBCUTANEOUS at 12:51

## 2025-04-28 RX ADMIN — DIBASIC SODIUM PHOSPHATE, MONOBASIC POTASSIUM PHOSPHATE AND MONOBASIC SODIUM PHOSPHATE 2 TABLET: 852; 155; 130 TABLET ORAL at 20:12

## 2025-04-28 RX ADMIN — DIBASIC SODIUM PHOSPHATE, MONOBASIC POTASSIUM PHOSPHATE AND MONOBASIC SODIUM PHOSPHATE 2 TABLET: 852; 155; 130 TABLET ORAL at 12:50

## 2025-04-28 RX ADMIN — FAMOTIDINE 20 MG: 20 TABLET, FILM COATED ORAL at 10:02

## 2025-04-28 RX ADMIN — INSULIN GLARGINE 35 UNITS: 100 INJECTION, SOLUTION SUBCUTANEOUS at 20:18

## 2025-04-28 RX ADMIN — POTASSIUM CHLORIDE 10 MEQ: 7.46 INJECTION, SOLUTION INTRAVENOUS at 03:53

## 2025-04-28 RX ADMIN — POTASSIUM PHOSPHATE, MONOBASIC POTASSIUM PHOSPHATE, DIBASIC 30 MMOL: 224; 236 INJECTION, SOLUTION, CONCENTRATE INTRAVENOUS at 10:05

## 2025-04-28 RX ADMIN — FAMOTIDINE 20 MG: 20 TABLET, FILM COATED ORAL at 20:12

## 2025-04-28 RX ADMIN — ACETAMINOPHEN 650 MG: 325 TABLET ORAL at 00:26

## 2025-04-28 RX ADMIN — ATORVASTATIN CALCIUM 20 MG: 10 TABLET, FILM COATED ORAL at 10:02

## 2025-04-28 RX ADMIN — POTASSIUM CHLORIDE 10 MEQ: 7.46 INJECTION, SOLUTION INTRAVENOUS at 05:46

## 2025-04-28 RX ADMIN — DIBASIC SODIUM PHOSPHATE, MONOBASIC POTASSIUM PHOSPHATE AND MONOBASIC SODIUM PHOSPHATE 2 TABLET: 852; 155; 130 TABLET ORAL at 17:30

## 2025-04-28 RX ADMIN — POTASSIUM & SODIUM PHOSPHATES POWDER PACK 280-160-250 MG 250 MG: 280-160-250 PACK at 17:30

## 2025-04-28 RX ADMIN — SODIUM CHLORIDE: 0.9 INJECTION, SOLUTION INTRAVENOUS at 20:04

## 2025-04-28 ASSESSMENT — PAIN DESCRIPTION - DESCRIPTORS: DESCRIPTORS: ACHING

## 2025-04-28 ASSESSMENT — PAIN DESCRIPTION - LOCATION: LOCATION: HEAD

## 2025-04-28 ASSESSMENT — PAIN SCALES - WONG BAKER: WONGBAKER_NUMERICALRESPONSE: NO HURT

## 2025-04-28 ASSESSMENT — PAIN SCALES - GENERAL
PAINLEVEL_OUTOF10: 3
PAINLEVEL_OUTOF10: 0

## 2025-04-28 ASSESSMENT — PAIN DESCRIPTION - ORIENTATION: ORIENTATION: MID

## 2025-04-28 ASSESSMENT — PAIN - FUNCTIONAL ASSESSMENT: PAIN_FUNCTIONAL_ASSESSMENT: ACTIVITIES ARE NOT PREVENTED

## 2025-04-28 NOTE — CARE COORDINATION
Ability to make needs known: Good   Family able to assist with home care needs: Other (comment)  (limited, father working)   Would you like for me to discuss the discharge plan with any other family members/significant others, and if so, who? No  (Father if needed)   Financial Resources Other (Comment)  (Medicaid pending)   Community Resources None   CM/SW Referral Other (see comment)  (Discharge planning: unemploye, unable to afford his medications)

## 2025-04-28 NOTE — CARE COORDINATION
Spoke with Katrin/Med Assist. Pt is on flagged list. Has been given aid several times but has not completed the application for further assistance. Per Med Assist, the pt has also received help from [facility] and has been non compliant with referrals to [facility]. Can receive medications for free at [facility].     Sent message to [employer] to confirm if the pt has medicaid or active insurance aside from medicaid pending.     CM left  for Cristhian/ + Patient Navigator. Waiting on  to see pt and discuss discharge planning.

## 2025-04-29 VITALS
OXYGEN SATURATION: 99 % | SYSTOLIC BLOOD PRESSURE: 122 MMHG | BODY MASS INDEX: 30.08 KG/M2 | HEIGHT: 63 IN | TEMPERATURE: 97.7 F | HEART RATE: 70 BPM | WEIGHT: 169.75 LBS | DIASTOLIC BLOOD PRESSURE: 81 MMHG | RESPIRATION RATE: 18 BRPM

## 2025-04-29 LAB
BASOPHILS # BLD: 0.02 K/UL
BASOPHILS NFR BLD: 1 % (ref 0–1)
EOSINOPHIL # BLD: 0.03 K/UL
EOSINOPHILS RELATIVE PERCENT: 1 % (ref 0–3)
ERYTHROCYTE [DISTWIDTH] IN BLOOD BY AUTOMATED COUNT: 14.6 % (ref 11.7–14.9)
GLUCOSE BLD-MCNC: 129 MG/DL (ref 74–99)
GLUCOSE BLD-MCNC: 183 MG/DL (ref 74–99)
GLUCOSE BLD-MCNC: 238 MG/DL (ref 74–99)
HCT VFR BLD AUTO: 30.4 % (ref 42–52)
HGB BLD-MCNC: 10.5 G/DL (ref 13.5–18)
IMM GRANULOCYTES # BLD AUTO: 0.01 K/UL
IMM GRANULOCYTES NFR BLD: 0 %
LYMPHOCYTES NFR BLD: 1.98 K/UL
LYMPHOCYTES RELATIVE PERCENT: 51 % (ref 24–44)
MCH RBC QN AUTO: 27.5 PG (ref 27–31)
MCHC RBC AUTO-ENTMCNC: 34.5 G/DL (ref 32–36)
MCV RBC AUTO: 79.6 FL (ref 78–100)
MONOCYTES NFR BLD: 0.47 K/UL
MONOCYTES NFR BLD: 12 % (ref 0–5)
NEUTROPHILS NFR BLD: 35 % (ref 36–66)
NEUTS SEG NFR BLD: 1.38 K/UL
PHOSPHATE SERPL-MCNC: 2.7 MG/DL (ref 2.5–4.9)
PLATELET # BLD AUTO: 251 K/UL (ref 140–440)
PMV BLD AUTO: 10.5 FL (ref 7.5–11.1)
RBC # BLD AUTO: 3.82 M/UL (ref 4.6–6.2)
WBC OTHER # BLD: 3.9 K/UL (ref 4–10.5)

## 2025-04-29 PROCEDURE — 94761 N-INVAS EAR/PLS OXIMETRY MLT: CPT

## 2025-04-29 PROCEDURE — 2580000003 HC RX 258: Performed by: INTERNAL MEDICINE

## 2025-04-29 PROCEDURE — 82962 GLUCOSE BLOOD TEST: CPT

## 2025-04-29 PROCEDURE — 51798 US URINE CAPACITY MEASURE: CPT

## 2025-04-29 PROCEDURE — 36415 COLL VENOUS BLD VENIPUNCTURE: CPT

## 2025-04-29 PROCEDURE — 6370000000 HC RX 637 (ALT 250 FOR IP): Performed by: INTERNAL MEDICINE

## 2025-04-29 PROCEDURE — 6370000000 HC RX 637 (ALT 250 FOR IP): Performed by: NURSE PRACTITIONER

## 2025-04-29 PROCEDURE — 6360000002 HC RX W HCPCS: Performed by: STUDENT IN AN ORGANIZED HEALTH CARE EDUCATION/TRAINING PROGRAM

## 2025-04-29 PROCEDURE — 84100 ASSAY OF PHOSPHORUS: CPT

## 2025-04-29 PROCEDURE — 85025 COMPLETE CBC W/AUTO DIFF WBC: CPT

## 2025-04-29 RX ORDER — FAMOTIDINE 20 MG/1
20 TABLET, FILM COATED ORAL 2 TIMES DAILY PRN
COMMUNITY
Start: 2025-04-29

## 2025-04-29 RX ORDER — MAGNESIUM OXIDE 400 MG/1
400 TABLET ORAL DAILY
Status: DISCONTINUED | OUTPATIENT
Start: 2025-04-29 | End: 2025-04-29

## 2025-04-29 RX ORDER — LANOLIN ALCOHOL/MO/W.PET/CERES
400 CREAM (GRAM) TOPICAL DAILY
Status: DISCONTINUED | OUTPATIENT
Start: 2025-04-29 | End: 2025-04-29 | Stop reason: HOSPADM

## 2025-04-29 RX ADMIN — INSULIN LISPRO 10 UNITS: 100 INJECTION, SOLUTION INTRAVENOUS; SUBCUTANEOUS at 13:03

## 2025-04-29 RX ADMIN — ACETAMINOPHEN 650 MG: 325 TABLET ORAL at 02:13

## 2025-04-29 RX ADMIN — Medication 400 MG: at 11:12

## 2025-04-29 RX ADMIN — FAMOTIDINE 20 MG: 20 TABLET, FILM COATED ORAL at 08:56

## 2025-04-29 RX ADMIN — INSULIN LISPRO 2 UNITS: 100 INJECTION, SOLUTION INTRAVENOUS; SUBCUTANEOUS at 12:41

## 2025-04-29 RX ADMIN — ENOXAPARIN SODIUM 40 MG: 100 INJECTION SUBCUTANEOUS at 08:56

## 2025-04-29 RX ADMIN — SODIUM CHLORIDE: 0.9 INJECTION, SOLUTION INTRAVENOUS at 06:19

## 2025-04-29 RX ADMIN — ATORVASTATIN CALCIUM 20 MG: 10 TABLET, FILM COATED ORAL at 08:56

## 2025-04-29 RX ADMIN — DIBASIC SODIUM PHOSPHATE, MONOBASIC POTASSIUM PHOSPHATE AND MONOBASIC SODIUM PHOSPHATE 2 TABLET: 852; 155; 130 TABLET ORAL at 08:56

## 2025-04-29 RX ADMIN — DIBASIC SODIUM PHOSPHATE, MONOBASIC POTASSIUM PHOSPHATE AND MONOBASIC SODIUM PHOSPHATE 2 TABLET: 852; 155; 130 TABLET ORAL at 12:41

## 2025-04-29 ASSESSMENT — PAIN DESCRIPTION - DESCRIPTORS: DESCRIPTORS: ACHING

## 2025-04-29 ASSESSMENT — PAIN - FUNCTIONAL ASSESSMENT: PAIN_FUNCTIONAL_ASSESSMENT: ACTIVITIES ARE NOT PREVENTED

## 2025-04-29 ASSESSMENT — PAIN DESCRIPTION - ORIENTATION: ORIENTATION: RIGHT;LEFT

## 2025-04-29 ASSESSMENT — PAIN DESCRIPTION - LOCATION: LOCATION: HEAD

## 2025-04-29 ASSESSMENT — PAIN SCALES - GENERAL
PAINLEVEL_OUTOF10: 0
PAINLEVEL_OUTOF10: 6

## 2025-04-29 ASSESSMENT — PAIN SCALES - WONG BAKER: WONGBAKER_NUMERICALRESPONSE: NO HURT

## 2025-04-29 NOTE — PLAN OF CARE
Problem: Chronic Conditions and Co-morbidities  Goal: Patient's chronic conditions and co-morbidity symptoms are monitored and maintained or improved  4/27/2025 0728 by Helena Zaman RN  Outcome: Progressing  4/27/2025 0452 by Inez oFster RN  Outcome: Progressing     Problem: Discharge Planning  Goal: Discharge to home or other facility with appropriate resources  4/27/2025 0728 by Helena Zaman RN  Outcome: Progressing  4/27/2025 0452 by Inez Foster RN  Outcome: Progressing     Problem: Safety - Adult  Goal: Free from fall injury  4/27/2025 0728 by Helena Zaman RN  Outcome: Progressing  4/27/2025 0452 by Inez Foster RN  Outcome: Progressing     Problem: Skin/Tissue Integrity  Goal: Skin integrity remains intact  Description: 1.  Monitor for areas of redness and/or skin breakdown2.  Assess vascular access sites hourly3.  Every 4-6 hours minimum:  Change oxygen saturation probe site4.  Every 4-6 hours:  If on nasal continuous positive airway pressure, respiratory therapy assess nares and determine need for appliance change or resting period  Outcome: Progressing     Problem: Nutrition Deficit:  Goal: Optimize nutritional status  Outcome: Progressing     Problem: Skin/Tissue Integrity - Adult  Goal: Skin integrity remains intact  Description: 1.  Monitor for areas of redness and/or skin breakdown2.  Assess vascular access sites hourly3.  Every 4-6 hours minimum:  Change oxygen saturation probe site4.  Every 4-6 hours:  If on nasal continuous positive airway pressure, respiratory therapy assess nares and determine need for appliance change or resting period  Outcome: Progressing     Problem: Musculoskeletal - Adult  Goal: Return mobility to safest level of function  Outcome: Progressing     Problem: Gastrointestinal - Adult  Goal: Minimal or absence of nausea and vomiting  Outcome: Progressing  Goal: Maintains or returns to baseline bowel function  Outcome: Progressing  Goal: Maintains adequate nutritional 
  Problem: Chronic Conditions and Co-morbidities  Goal: Patient's chronic conditions and co-morbidity symptoms are monitored and maintained or improved  Outcome: Progressing     Problem: Discharge Planning  Goal: Discharge to home or other facility with appropriate resources  Outcome: Progressing     Problem: Safety - Adult  Goal: Free from fall injury  Outcome: Progressing     
  Problem: Chronic Conditions and Co-morbidities  Goal: Patient's chronic conditions and co-morbidity symptoms are monitored and maintained or improved  Outcome: Progressing     Problem: Discharge Planning  Goal: Discharge to home or other facility with appropriate resources  Outcome: Progressing     Problem: Safety - Adult  Goal: Free from fall injury  Outcome: Progressing     Problem: Skin/Tissue Integrity  Goal: Skin integrity remains intact  Description: 1.  Monitor for areas of redness and/or skin breakdown2.  Assess vascular access sites hourly3.  Every 4-6 hours minimum:  Change oxygen saturation probe site4.  Every 4-6 hours:  If on nasal continuous positive airway pressure, respiratory therapy assess nares and determine need for appliance change or resting period  Outcome: Progressing     Problem: Nutrition Deficit:  Goal: Optimize nutritional status  Outcome: Progressing     Problem: Skin/Tissue Integrity - Adult  Goal: Skin integrity remains intact  Description: 1.  Monitor for areas of redness and/or skin breakdown2.  Assess vascular access sites hourly3.  Every 4-6 hours minimum:  Change oxygen saturation probe site4.  Every 4-6 hours:  If on nasal continuous positive airway pressure, respiratory therapy assess nares and determine need for appliance change or resting period  Outcome: Progressing     Problem: Musculoskeletal - Adult  Goal: Return mobility to safest level of function  Outcome: Progressing     Problem: Gastrointestinal - Adult  Goal: Minimal or absence of nausea and vomiting  Outcome: Progressing  Goal: Maintains or returns to baseline bowel function  Outcome: Progressing  Goal: Maintains adequate nutritional intake  Outcome: Progressing     Problem: Infection - Adult  Goal: Absence of infection at discharge  Outcome: Progressing     Problem: Metabolic/Fluid and Electrolytes - Adult  Goal: Electrolytes maintained within normal limits  Outcome: Progressing  Goal: Hemodynamic stability and 
  Problem: Chronic Conditions and Co-morbidities  Goal: Patient's chronic conditions and co-morbidity symptoms are monitored and maintained or improved  Outcome: Progressing  Flowsheets (Taken 4/27/2025 1200 by Helena Zaman RN)  Care Plan - Patient's Chronic Conditions and Co-Morbidity Symptoms are Monitored and Maintained or Improved: Monitor and assess patient's chronic conditions and comorbid symptoms for stability, deterioration, or improvement     Problem: Discharge Planning  Goal: Discharge to home or other facility with appropriate resources  Outcome: Progressing  Flowsheets (Taken 4/27/2025 1200 by Helena Zaman, RN)  Discharge to home or other facility with appropriate resources: Identify barriers to discharge with patient and caregiver     Problem: Safety - Adult  Goal: Free from fall injury  Outcome: Progressing     Problem: Skin/Tissue Integrity  Goal: Skin integrity remains intact  Description: 1.  Monitor for areas of redness and/or skin breakdown2.  Assess vascular access sites hourly3.  Every 4-6 hours minimum:  Change oxygen saturation probe site4.  Every 4-6 hours:  If on nasal continuous positive airway pressure, respiratory therapy assess nares and determine need for appliance change or resting period  Outcome: Progressing     Problem: Nutrition Deficit:  Goal: Optimize nutritional status  Outcome: Progressing     Problem: Skin/Tissue Integrity - Adult  Goal: Skin integrity remains intact  Description: 1.  Monitor for areas of redness and/or skin breakdown2.  Assess vascular access sites hourly3.  Every 4-6 hours minimum:  Change oxygen saturation probe site4.  Every 4-6 hours:  If on nasal continuous positive airway pressure, respiratory therapy assess nares and determine need for appliance change or resting period  Outcome: Progressing     Problem: Musculoskeletal - Adult  Goal: Return mobility to safest level of function  Outcome: Progressing  Flowsheets (Taken 4/27/2025 1200 by Helena Zaman 
  Problem: Musculoskeletal - Adult  Goal: Return mobility to safest level of function  4/29/2025 1508 by Mauricio Freeman RN  Outcome: Progressing  4/29/2025 1226 by Mauricio Freeman RN  Outcome: Progressing     Problem: Gastrointestinal - Adult  Goal: Minimal or absence of nausea and vomiting  4/29/2025 1508 by Mauricio Freeman RN  Outcome: Progressing  4/29/2025 1226 by Mauricio Freeman RN  Outcome: Progressing  Goal: Maintains or returns to baseline bowel function  4/29/2025 1508 by Mauricio Freeman RN  Outcome: Progressing  4/29/2025 1226 by Mauricio Freeman RN  Outcome: Progressing  Goal: Maintains adequate nutritional intake  4/29/2025 1508 by Mauricio Freeman RN  Outcome: Progressing  4/29/2025 1226 by Mauricio Freeman RN  Outcome: Progressing     Problem: Infection - Adult  Goal: Absence of infection at discharge  4/29/2025 1508 by Mauricio Freeman RN  Outcome: Progressing  4/29/2025 1226 by Mauricio Freeman RN  Outcome: Progressing     Problem: Metabolic/Fluid and Electrolytes - Adult  Goal: Electrolytes maintained within normal limits  4/29/2025 1508 by Mauricio Freeman RN  Outcome: Progressing  4/29/2025 1226 by Mauricio Freeman RN  Outcome: Progressing  Goal: Hemodynamic stability and optimal renal function maintained  4/29/2025 1508 by Maruicio Freeman, RN  Outcome: Progressing  4/29/2025 1226 by Mauricio Freeman RN  Outcome: Progressing  Goal: Glucose maintained within prescribed range  4/29/2025 1508 by Mauricio Freeman RN  Outcome: Progressing  4/29/2025 1226 by Mauricio Freeman RN  Outcome: Progressing     Problem: Pain  Goal: Verbalizes/displays adequate comfort level or baseline comfort level  4/29/2025 1508 by Mauricio Freeman RN  Outcome: Progressing  4/29/2025 1226 by Mauricio Freeman RN  Outcome: Progressing     Problem: Cardiovascular - Adult  Goal: Maintains optimal cardiac output and hemodynamic stability  4/29/2025 1508 by Mauricio Freeman RN  Outcome: 
Progressing     Problem: Musculoskeletal - Adult  Goal: Return mobility to safest level of function  4/28/2025 2113 by Anna Nova LPN  Outcome: Progressing  4/28/2025 1230 by Neeru Cortes RN  Outcome: Progressing     Problem: Gastrointestinal - Adult  Goal: Minimal or absence of nausea and vomiting  4/28/2025 2113 by Anna Nova LPN  Outcome: Progressing  4/28/2025 1230 by Neeru Cortes RN  Outcome: Progressing  Goal: Maintains or returns to baseline bowel function  4/28/2025 2113 by Anna Nova LPN  Outcome: Progressing  4/28/2025 1230 by Neeru Cortes RN  Outcome: Progressing  Goal: Maintains adequate nutritional intake  4/28/2025 2113 by Anna Nova LPN  Outcome: Progressing  4/28/2025 1230 by Neeru Cortes RN  Outcome: Progressing     Problem: Infection - Adult  Goal: Absence of infection at discharge  4/28/2025 2113 by Anna Nova LPN  Outcome: Progressing  4/28/2025 1230 by Neeru Cortes RN  Outcome: Progressing     Problem: Metabolic/Fluid and Electrolytes - Adult  Goal: Electrolytes maintained within normal limits  4/28/2025 2113 by Anna Nova LPN  Outcome: Progressing  4/28/2025 1230 by Neeru Cortes RN  Outcome: Progressing  Goal: Hemodynamic stability and optimal renal function maintained  4/28/2025 2113 by Anna Nova LPN  Outcome: Progressing  4/28/2025 1230 by Neeru Cortes RN  Outcome: Progressing  Goal: Glucose maintained within prescribed range  4/28/2025 2113 by Anna Nova LPN  Outcome: Progressing  4/28/2025 1230 by Neeru Cortes RN  Outcome: Progressing     Problem: Cardiovascular - Adult  Goal: Maintains optimal cardiac output and hemodynamic stability  4/28/2025 2113 by Anna Nova LPN  Outcome: Progressing  4/28/2025 1230 by Neeru Cortes RN  Outcome: Progressing     Problem: Pain  Goal: Verbalizes/displays adequate comfort level or baseline comfort level  4/28/2025 2113 by Anna Nova 
hourly3.  Every 4-6 hours minimum:  Change oxygen saturation probe site4.  Every 4-6 hours:  If on nasal continuous positive airway pressure, respiratory therapy assess nares and determine need for appliance change or resting period  4/29/2025 1516 by Mauricio Freeman RN  Outcome: Adequate for Discharge  4/29/2025 1508 by Mauricio Freeman RN  Outcome: Progressing  4/29/2025 1226 by Mauricio Freeman RN  Outcome: Progressing     Problem: Musculoskeletal - Adult  Goal: Return mobility to safest level of function  4/29/2025 1516 by Mauricio Freeman RN  Outcome: Adequate for Discharge  4/29/2025 1508 by Mauricio Freeman RN  Outcome: Progressing  4/29/2025 1226 by Mauricio Freeman RN  Outcome: Progressing     Problem: Gastrointestinal - Adult  Goal: Minimal or absence of nausea and vomiting  4/29/2025 1516 by Mauricio Freeman, RN  Outcome: Adequate for Discharge  4/29/2025 1508 by Mauricio Freeman RN  Outcome: Progressing  4/29/2025 1226 by Mauricio Freeman RN  Outcome: Progressing  Goal: Maintains or returns to baseline bowel function  4/29/2025 1516 by Mauricio Freeman RN  Outcome: Adequate for Discharge  4/29/2025 1508 by Mauricio Freeman RN  Outcome: Progressing  4/29/2025 1226 by Mauricio Freeman RN  Outcome: Progressing  Goal: Maintains adequate nutritional intake  4/29/2025 1516 by Mauricio Freeman RN  Outcome: Adequate for Discharge  4/29/2025 1508 by Mauricio Freeman RN  Outcome: Progressing  4/29/2025 1226 by Mauricio Freeman RN  Outcome: Progressing     Problem: Infection - Adult  Goal: Absence of infection at discharge  4/29/2025 1516 by Mauricio Freeman RN  Outcome: Adequate for Discharge  4/29/2025 1508 by Mauricio Freeman RN  Outcome: Progressing  4/29/2025 1226 by Mauricio Freeman RN  Outcome: Progressing     Problem: Metabolic/Fluid and Electrolytes - Adult  Goal: Electrolytes maintained within normal limits  4/29/2025 1516 by Bootes, Mauricio A, RN  Outcome: Adequate for 
Metabolic/Fluid and Electrolytes - Adult  Goal: Hemodynamic stability and optimal renal function maintained  4/28/2025 1230 by Neeru Cortes RN  Outcome: Progressing  4/28/2025 0023 by Anna Nova LPN  Outcome: Progressing  Flowsheets (Taken 4/27/2025 1200 by Helena Zaman, ROHIT)  Hemodynamic stability and optimal renal function maintained: Monitor labs and assess for signs and symptoms of volume excess or deficit     Problem: Metabolic/Fluid and Electrolytes - Adult  Goal: Glucose maintained within prescribed range  4/28/2025 1230 by Neeru Cortes RN  Outcome: Progressing  4/28/2025 0023 by Anna Nova LPN  Outcome: Progressing  Flowsheets (Taken 4/27/2025 1200 by Helena Zaman, ROHIT)  Glucose maintained within prescribed range: Monitor blood glucose as ordered

## 2025-04-29 NOTE — CARE COORDINATION
Patient is on the [employer] flagged list and is not eligible for assistance at this time.  Patient has received a voucher to cover costs of discharge medications [facility]      At each admission, [facility] [phone] to make appointment to complete application and provide requested documents to determine eligibility for the program.  Patient has not completed that process.      Patient has also been referred to [employer] and the [facility] Department for assistance with diabetic supplies and medications.      Patient will need to follow up with [employer] to complete Medicaid application process.      [employer] application will be mailed to patient's home address listed in EP[employer] today.

## 2025-04-29 NOTE — DISCHARGE SUMMARY
\"TRIG\"  Hemoglobin A1C:   Lab Results   Component Value Date/Time    LABA1C 15.2 04/26/2025 09:19 PM     TSH: No results found for: \"TSH\"  Troponin: No results found for: \"TROPONINT\"  Lactic Acid: No results for input(s): \"LACTA\" in the last 72 hours.  BNP: No results for input(s): \"PROBNP\" in the last 72 hours.  UA:  Lab Results   Component Value Date/Time    NITRU NEGATIVE 04/26/2025 04:37 PM    COLORU Yellow 04/26/2025 04:37 PM    PHUR 5.5 04/26/2025 04:37 PM    WBCUA 0 04/26/2025 04:37 PM    RBCUA 0 04/26/2025 04:37 PM    RBCUA 2 11/29/2023 02:20 PM    MUCUS RARE 04/26/2025 04:37 PM    TRICHOMONAS NONE SEEN 11/29/2023 02:20 PM    BACTERIA NEGATIVE 11/29/2023 02:20 PM    CLARITYU CLEAR 04/21/2024 11:10 AM    LEUKOCYTESUR NEGATIVE 04/26/2025 04:37 PM    UROBILINOGEN 0.2 04/26/2025 04:37 PM    BILIRUBINUR NEGATIVE 04/26/2025 04:37 PM    BLOODU NEGATIVE 04/21/2024 11:10 AM    GLUCOSEU 500 04/26/2025 04:37 PM    KETUA >80 04/26/2025 04:37 PM     Urine Cultures: No results found for: \"LABURIN\"  Blood Cultures: No results found for: \"BC\"  No results found for: \"BLOODCULT2\"  Organism: No results found for: \"ORG\"    Time Spent Discharging patient 35 minutes    Electronically signed by CHEY MORALES CNP on 4/29/2025 at 2:49 PM